# Patient Record
Sex: FEMALE | Race: WHITE | NOT HISPANIC OR LATINO | URBAN - METROPOLITAN AREA
[De-identification: names, ages, dates, MRNs, and addresses within clinical notes are randomized per-mention and may not be internally consistent; named-entity substitution may affect disease eponyms.]

---

## 2017-12-27 ENCOUNTER — INPATIENT (INPATIENT)
Facility: HOSPITAL | Age: 82
LOS: 6 days | Discharge: NURSING, REHAB OR LONG-TERM CARE | End: 2018-01-03
Attending: SURGERY | Admitting: SURGERY
Payer: MEDICARE

## 2017-12-27 VITALS
OXYGEN SATURATION: 97 % | WEIGHT: 192.02 LBS | DIASTOLIC BLOOD PRESSURE: 102 MMHG | SYSTOLIC BLOOD PRESSURE: 157 MMHG | TEMPERATURE: 99 F | HEIGHT: 57 IN | RESPIRATION RATE: 18 BRPM | HEART RATE: 111 BPM

## 2017-12-27 LAB
ALBUMIN SERPL ELPH-MCNC: 3.4 G/DL — SIGNIFICANT CHANGE UP (ref 3.3–5)
ALP SERPL-CCNC: 95 U/L — SIGNIFICANT CHANGE UP (ref 40–120)
ALT FLD-CCNC: 16 U/L — SIGNIFICANT CHANGE UP (ref 12–78)
ANION GAP SERPL CALC-SCNC: 10 MMOL/L — SIGNIFICANT CHANGE UP (ref 5–17)
APPEARANCE UR: CLEAR — SIGNIFICANT CHANGE UP
APTT BLD: 26.4 SEC — LOW (ref 27.5–37.4)
AST SERPL-CCNC: 19 U/L — SIGNIFICANT CHANGE UP (ref 15–37)
BACTERIA # UR AUTO: (no result)
BASOPHILS # BLD AUTO: 0 K/UL — SIGNIFICANT CHANGE UP (ref 0–0.2)
BASOPHILS NFR BLD AUTO: 0.2 % — SIGNIFICANT CHANGE UP (ref 0–2)
BILIRUB SERPL-MCNC: 1.2 MG/DL — SIGNIFICANT CHANGE UP (ref 0.2–1.2)
BILIRUB UR-MCNC: NEGATIVE — SIGNIFICANT CHANGE UP
BUN SERPL-MCNC: 21 MG/DL — SIGNIFICANT CHANGE UP (ref 7–23)
CALCIUM SERPL-MCNC: 9.5 MG/DL — SIGNIFICANT CHANGE UP (ref 8.5–10.1)
CHLORIDE SERPL-SCNC: 97 MMOL/L — SIGNIFICANT CHANGE UP (ref 96–108)
CO2 SERPL-SCNC: 26 MMOL/L — SIGNIFICANT CHANGE UP (ref 22–31)
COLOR SPEC: YELLOW — SIGNIFICANT CHANGE UP
CREAT SERPL-MCNC: 0.99 MG/DL — SIGNIFICANT CHANGE UP (ref 0.5–1.3)
DIFF PNL FLD: (no result)
EOSINOPHIL # BLD AUTO: 0 K/UL — SIGNIFICANT CHANGE UP (ref 0–0.5)
EOSINOPHIL NFR BLD AUTO: 0 % — SIGNIFICANT CHANGE UP (ref 0–6)
EPI CELLS # UR: SIGNIFICANT CHANGE UP
GLUCOSE SERPL-MCNC: 252 MG/DL — HIGH (ref 70–99)
GLUCOSE UR QL: 250 MG/DL
HCT VFR BLD CALC: 48.1 % — HIGH (ref 34.5–45)
HGB BLD-MCNC: 15.5 G/DL — SIGNIFICANT CHANGE UP (ref 11.5–15.5)
HYALINE CASTS # UR AUTO: (no result) /LPF
INR BLD: 1.12 RATIO — SIGNIFICANT CHANGE UP (ref 0.88–1.16)
KETONES UR-MCNC: (no result)
LACTATE SERPL-SCNC: 2.2 MMOL/L — HIGH (ref 0.7–2)
LEUKOCYTE ESTERASE UR-ACNC: (no result)
LYMPHOCYTES # BLD AUTO: 1 K/UL — SIGNIFICANT CHANGE UP (ref 1–3.3)
LYMPHOCYTES # BLD AUTO: 5.9 % — LOW (ref 13–44)
MCHC RBC-ENTMCNC: 28.1 PG — SIGNIFICANT CHANGE UP (ref 27–34)
MCHC RBC-ENTMCNC: 32.2 GM/DL — SIGNIFICANT CHANGE UP (ref 32–36)
MCV RBC AUTO: 87.4 FL — SIGNIFICANT CHANGE UP (ref 80–100)
MONOCYTES # BLD AUTO: 1.1 K/UL — HIGH (ref 0–0.9)
MONOCYTES NFR BLD AUTO: 6.1 % — SIGNIFICANT CHANGE UP (ref 2–14)
NEUTROPHILS # BLD AUTO: 15.6 K/UL — HIGH (ref 1.8–7.4)
NEUTROPHILS NFR BLD AUTO: 87.9 % — HIGH (ref 43–77)
NITRITE UR-MCNC: NEGATIVE — SIGNIFICANT CHANGE UP
PH UR: 5 — SIGNIFICANT CHANGE UP (ref 5–8)
PLATELET # BLD AUTO: 306 K/UL — SIGNIFICANT CHANGE UP (ref 150–400)
POTASSIUM SERPL-MCNC: 4 MMOL/L — SIGNIFICANT CHANGE UP (ref 3.5–5.3)
POTASSIUM SERPL-SCNC: 4 MMOL/L — SIGNIFICANT CHANGE UP (ref 3.5–5.3)
PROT SERPL-MCNC: 7.9 GM/DL — SIGNIFICANT CHANGE UP (ref 6–8.3)
PROT UR-MCNC: 100 MG/DL
PROTHROM AB SERPL-ACNC: 12.1 SEC — SIGNIFICANT CHANGE UP (ref 9.8–12.7)
RBC # BLD: 5.5 M/UL — HIGH (ref 3.8–5.2)
RBC # FLD: 14.6 % — HIGH (ref 10.3–14.5)
RBC CASTS # UR COMP ASSIST: (no result) /HPF (ref 0–4)
SODIUM SERPL-SCNC: 133 MMOL/L — LOW (ref 135–145)
SP GR SPEC: 1.02 — SIGNIFICANT CHANGE UP (ref 1.01–1.02)
TROPONIN I SERPL-MCNC: 0.06 NG/ML — HIGH (ref 0.01–0.04)
UROBILINOGEN FLD QL: 1 MG/DL
WBC # BLD: 17.8 K/UL — HIGH (ref 3.8–10.5)
WBC # FLD AUTO: 17.8 K/UL — HIGH (ref 3.8–10.5)
WBC UR QL: (no result)

## 2017-12-27 PROCEDURE — 71010: CPT | Mod: 26

## 2017-12-27 RX ORDER — SODIUM CHLORIDE 9 MG/ML
3 INJECTION INTRAMUSCULAR; INTRAVENOUS; SUBCUTANEOUS ONCE
Qty: 0 | Refills: 0 | Status: COMPLETED | OUTPATIENT
Start: 2017-12-27 | End: 2017-12-27

## 2017-12-27 RX ORDER — ONDANSETRON 8 MG/1
8 TABLET, FILM COATED ORAL ONCE
Qty: 0 | Refills: 0 | Status: COMPLETED | OUTPATIENT
Start: 2017-12-27 | End: 2017-12-27

## 2017-12-27 RX ORDER — CIPROFLOXACIN LACTATE 400MG/40ML
400 VIAL (ML) INTRAVENOUS ONCE
Qty: 0 | Refills: 0 | Status: COMPLETED | OUTPATIENT
Start: 2017-12-27 | End: 2017-12-27

## 2017-12-27 RX ORDER — ACETAMINOPHEN 500 MG
650 TABLET ORAL ONCE
Qty: 0 | Refills: 0 | Status: COMPLETED | OUTPATIENT
Start: 2017-12-27 | End: 2017-12-27

## 2017-12-27 RX ADMIN — SODIUM CHLORIDE 3 MILLILITER(S): 9 INJECTION INTRAMUSCULAR; INTRAVENOUS; SUBCUTANEOUS at 21:15

## 2017-12-27 RX ADMIN — Medication 200 MILLIGRAM(S): at 23:42

## 2017-12-27 RX ADMIN — ONDANSETRON 8 MILLIGRAM(S): 8 TABLET, FILM COATED ORAL at 22:48

## 2017-12-27 RX ADMIN — Medication 650 MILLIGRAM(S): at 21:15

## 2017-12-27 RX ADMIN — Medication 10 MILLIGRAM(S): at 23:42

## 2017-12-27 NOTE — ED ADULT TRIAGE NOTE - CHIEF COMPLAINT QUOTE
Pt BIBA c/o weakness, nausea and vomiting. As per pt's family, pt has not eaten in the last few days and has not been taking medication.  Pt with hx of colon ca, a-fib.

## 2017-12-27 NOTE — ED PROVIDER NOTE - OBJECTIVE STATEMENT
89 y/o F w/ pmhx of colon cancer, colon resection, Afib, presents to ED c/o weakness, and intermittent abdominal pain. Pt has been having worsening abd pain, gas, indigestion and vomiting. As per family pt cannot tolerate PO, pt vomits 30 minutes after eating. Last BM 2 days ago. This week pain has been worsening, especially bad past 2 days. GI Dr. Herrera. Pt lives with family.

## 2017-12-27 NOTE — ED ADULT NURSE NOTE - OBJECTIVE STATEMENT
pt c/o of dark burgundy emesis, nausea, constipation and weakness x2 days. patient with fever upon arrival. cardiac and VS monitoring initiated.

## 2017-12-27 NOTE — ED PROVIDER NOTE - PROGRESS NOTE DETAILS
AS:  pt received at shift change from Dr Mckeon pending CT.  CT report called by Dr Sanches, high grade SBO with transition at hernia.  Case d/w Dr Villafana, will see pt

## 2017-12-27 NOTE — ED PROVIDER NOTE - NS_ ATTENDINGSCRIBEDETAILS _ED_A_ED_FT
I, Yaya Mckeon MD,  performed the initial face to face bedside interview with this patient regarding history of present illness, review of symptoms and relevant past medical, social and family history.  I completed an independent physical examination.    The history, relevant review of systems, past medical and surgical history, medical decision making, and physical examination was documented by the scribe in my presence and I attest to the accuracy of the documentation.

## 2017-12-28 DIAGNOSIS — I48.2 CHRONIC ATRIAL FIBRILLATION: ICD-10-CM

## 2017-12-28 DIAGNOSIS — Z90.49 ACQUIRED ABSENCE OF OTHER SPECIFIED PARTS OF DIGESTIVE TRACT: Chronic | ICD-10-CM

## 2017-12-28 DIAGNOSIS — Z96.642 PRESENCE OF LEFT ARTIFICIAL HIP JOINT: Chronic | ICD-10-CM

## 2017-12-28 DIAGNOSIS — K56.609 UNSPECIFIED INTESTINAL OBSTRUCTION, UNSPECIFIED AS TO PARTIAL VERSUS COMPLETE OBSTRUCTION: ICD-10-CM

## 2017-12-28 DIAGNOSIS — I10 ESSENTIAL (PRIMARY) HYPERTENSION: ICD-10-CM

## 2017-12-28 LAB
ABO RH CONFIRMATION: SIGNIFICANT CHANGE UP
ANION GAP SERPL CALC-SCNC: 10 MMOL/L — SIGNIFICANT CHANGE UP (ref 5–17)
ANION GAP SERPL CALC-SCNC: 8 MMOL/L — SIGNIFICANT CHANGE UP (ref 5–17)
BASOPHILS # BLD AUTO: 0 K/UL — SIGNIFICANT CHANGE UP (ref 0–0.2)
BASOPHILS NFR BLD AUTO: 0.2 % — SIGNIFICANT CHANGE UP (ref 0–2)
BLD GP AB SCN SERPL QL: SIGNIFICANT CHANGE UP
BUN SERPL-MCNC: 24 MG/DL — HIGH (ref 7–23)
BUN SERPL-MCNC: 30 MG/DL — HIGH (ref 7–23)
CALCIUM SERPL-MCNC: 7.8 MG/DL — LOW (ref 8.5–10.1)
CALCIUM SERPL-MCNC: 8.4 MG/DL — LOW (ref 8.5–10.1)
CHLORIDE SERPL-SCNC: 103 MMOL/L — SIGNIFICANT CHANGE UP (ref 96–108)
CHLORIDE SERPL-SCNC: 99 MMOL/L — SIGNIFICANT CHANGE UP (ref 96–108)
CO2 SERPL-SCNC: 22 MMOL/L — SIGNIFICANT CHANGE UP (ref 22–31)
CO2 SERPL-SCNC: 27 MMOL/L — SIGNIFICANT CHANGE UP (ref 22–31)
CREAT SERPL-MCNC: 0.92 MG/DL — SIGNIFICANT CHANGE UP (ref 0.5–1.3)
CREAT SERPL-MCNC: 1.03 MG/DL — SIGNIFICANT CHANGE UP (ref 0.5–1.3)
EOSINOPHIL # BLD AUTO: 0 K/UL — SIGNIFICANT CHANGE UP (ref 0–0.5)
EOSINOPHIL NFR BLD AUTO: 0.2 % — SIGNIFICANT CHANGE UP (ref 0–6)
GLUCOSE BLDC GLUCOMTR-MCNC: 130 MG/DL — HIGH (ref 70–99)
GLUCOSE BLDC GLUCOMTR-MCNC: 163 MG/DL — HIGH (ref 70–99)
GLUCOSE SERPL-MCNC: 150 MG/DL — HIGH (ref 70–99)
GLUCOSE SERPL-MCNC: 204 MG/DL — HIGH (ref 70–99)
HBA1C BLD-MCNC: 6 % — HIGH (ref 4–5.6)
HCT VFR BLD CALC: 39.8 % — SIGNIFICANT CHANGE UP (ref 34.5–45)
HCT VFR BLD CALC: 44 % — SIGNIFICANT CHANGE UP (ref 34.5–45)
HGB BLD-MCNC: 12.7 G/DL — SIGNIFICANT CHANGE UP (ref 11.5–15.5)
HGB BLD-MCNC: 14 G/DL — SIGNIFICANT CHANGE UP (ref 11.5–15.5)
LACTATE SERPL-SCNC: 1.8 MMOL/L — SIGNIFICANT CHANGE UP (ref 0.7–2)
LYMPHOCYTES # BLD AUTO: 1.2 K/UL — SIGNIFICANT CHANGE UP (ref 1–3.3)
LYMPHOCYTES # BLD AUTO: 6.7 % — LOW (ref 13–44)
MAGNESIUM SERPL-MCNC: 1.6 MG/DL — SIGNIFICANT CHANGE UP (ref 1.6–2.6)
MCHC RBC-ENTMCNC: 27.7 PG — SIGNIFICANT CHANGE UP (ref 27–34)
MCHC RBC-ENTMCNC: 28.2 PG — SIGNIFICANT CHANGE UP (ref 27–34)
MCHC RBC-ENTMCNC: 31.8 GM/DL — LOW (ref 32–36)
MCHC RBC-ENTMCNC: 31.8 GM/DL — LOW (ref 32–36)
MCV RBC AUTO: 87.2 FL — SIGNIFICANT CHANGE UP (ref 80–100)
MCV RBC AUTO: 88.9 FL — SIGNIFICANT CHANGE UP (ref 80–100)
MONOCYTES # BLD AUTO: 1 K/UL — HIGH (ref 0–0.9)
MONOCYTES NFR BLD AUTO: 5.8 % — SIGNIFICANT CHANGE UP (ref 2–14)
NEUTROPHILS # BLD AUTO: 15.3 K/UL — HIGH (ref 1.8–7.4)
NEUTROPHILS NFR BLD AUTO: 87 % — HIGH (ref 43–77)
PLATELET # BLD AUTO: 167 K/UL — SIGNIFICANT CHANGE UP (ref 150–400)
PLATELET # BLD AUTO: 239 K/UL — SIGNIFICANT CHANGE UP (ref 150–400)
POTASSIUM SERPL-MCNC: 3.6 MMOL/L — SIGNIFICANT CHANGE UP (ref 3.5–5.3)
POTASSIUM SERPL-MCNC: 4 MMOL/L — SIGNIFICANT CHANGE UP (ref 3.5–5.3)
POTASSIUM SERPL-SCNC: 3.6 MMOL/L — SIGNIFICANT CHANGE UP (ref 3.5–5.3)
POTASSIUM SERPL-SCNC: 4 MMOL/L — SIGNIFICANT CHANGE UP (ref 3.5–5.3)
RBC # BLD: 4.48 M/UL — SIGNIFICANT CHANGE UP (ref 3.8–5.2)
RBC # BLD: 5.04 M/UL — SIGNIFICANT CHANGE UP (ref 3.8–5.2)
RBC # FLD: 14.6 % — HIGH (ref 10.3–14.5)
RBC # FLD: 14.8 % — HIGH (ref 10.3–14.5)
SODIUM SERPL-SCNC: 134 MMOL/L — LOW (ref 135–145)
SODIUM SERPL-SCNC: 135 MMOL/L — SIGNIFICANT CHANGE UP (ref 135–145)
TYPE + AB SCN PNL BLD: SIGNIFICANT CHANGE UP
WBC # BLD: 17.6 K/UL — HIGH (ref 3.8–10.5)
WBC # BLD: 9.9 K/UL — SIGNIFICANT CHANGE UP (ref 3.8–10.5)
WBC # FLD AUTO: 17.6 K/UL — HIGH (ref 3.8–10.5)
WBC # FLD AUTO: 9.9 K/UL — SIGNIFICANT CHANGE UP (ref 3.8–10.5)

## 2017-12-28 PROCEDURE — 93010 ELECTROCARDIOGRAM REPORT: CPT

## 2017-12-28 PROCEDURE — 99223 1ST HOSP IP/OBS HIGH 75: CPT

## 2017-12-28 PROCEDURE — 88302 TISSUE EXAM BY PATHOLOGIST: CPT | Mod: 26

## 2017-12-28 PROCEDURE — 74177 CT ABD & PELVIS W/CONTRAST: CPT | Mod: 26

## 2017-12-28 PROCEDURE — 99285 EMERGENCY DEPT VISIT HI MDM: CPT

## 2017-12-28 RX ORDER — DEXTROSE 50 % IN WATER 50 %
25 SYRINGE (ML) INTRAVENOUS ONCE
Qty: 0 | Refills: 0 | Status: DISCONTINUED | OUTPATIENT
Start: 2017-12-28 | End: 2017-12-30

## 2017-12-28 RX ORDER — HEPARIN SODIUM 5000 [USP'U]/ML
5000 INJECTION INTRAVENOUS; SUBCUTANEOUS EVERY 12 HOURS
Qty: 0 | Refills: 0 | Status: DISCONTINUED | OUTPATIENT
Start: 2017-12-28 | End: 2017-12-28

## 2017-12-28 RX ORDER — INSULIN GLARGINE 100 [IU]/ML
12 INJECTION, SOLUTION SUBCUTANEOUS
Qty: 0 | Refills: 0 | COMMUNITY

## 2017-12-28 RX ORDER — CIPROFLOXACIN LACTATE 400MG/40ML
400 VIAL (ML) INTRAVENOUS EVERY 12 HOURS
Qty: 0 | Refills: 0 | Status: DISCONTINUED | OUTPATIENT
Start: 2017-12-28 | End: 2017-12-28

## 2017-12-28 RX ORDER — METOPROLOL TARTRATE 50 MG
5 TABLET ORAL EVERY 6 HOURS
Qty: 0 | Refills: 0 | Status: DISCONTINUED | OUTPATIENT
Start: 2017-12-28 | End: 2018-01-01

## 2017-12-28 RX ORDER — DEXTROSE 50 % IN WATER 50 %
25 SYRINGE (ML) INTRAVENOUS ONCE
Qty: 0 | Refills: 0 | Status: DISCONTINUED | OUTPATIENT
Start: 2017-12-28 | End: 2017-12-28

## 2017-12-28 RX ORDER — METRONIDAZOLE 500 MG
500 TABLET ORAL ONCE
Qty: 0 | Refills: 0 | Status: DISCONTINUED | OUTPATIENT
Start: 2017-12-28 | End: 2017-12-28

## 2017-12-28 RX ORDER — DEXTROSE 50 % IN WATER 50 %
1 SYRINGE (ML) INTRAVENOUS ONCE
Qty: 0 | Refills: 0 | Status: DISCONTINUED | OUTPATIENT
Start: 2017-12-28 | End: 2017-12-28

## 2017-12-28 RX ORDER — SODIUM CHLORIDE 9 MG/ML
1000 INJECTION INTRAMUSCULAR; INTRAVENOUS; SUBCUTANEOUS ONCE
Qty: 0 | Refills: 0 | Status: COMPLETED | OUTPATIENT
Start: 2017-12-28 | End: 2017-12-28

## 2017-12-28 RX ORDER — ONDANSETRON 8 MG/1
4 TABLET, FILM COATED ORAL EVERY 6 HOURS
Qty: 0 | Refills: 0 | Status: DISCONTINUED | OUTPATIENT
Start: 2017-12-28 | End: 2018-01-03

## 2017-12-28 RX ORDER — GLIPIZIDE/METFORMIN HCL 2.5-500 MG
0 TABLET ORAL
Qty: 0 | Refills: 0 | COMMUNITY

## 2017-12-28 RX ORDER — HYDROMORPHONE HYDROCHLORIDE 2 MG/ML
30 INJECTION INTRAMUSCULAR; INTRAVENOUS; SUBCUTANEOUS
Qty: 0 | Refills: 0 | Status: DISCONTINUED | OUTPATIENT
Start: 2017-12-28 | End: 2017-12-30

## 2017-12-28 RX ORDER — HYDROMORPHONE HYDROCHLORIDE 2 MG/ML
0.5 INJECTION INTRAMUSCULAR; INTRAVENOUS; SUBCUTANEOUS EVERY 4 HOURS
Qty: 0 | Refills: 0 | Status: DISCONTINUED | OUTPATIENT
Start: 2017-12-28 | End: 2017-12-28

## 2017-12-28 RX ORDER — INSULIN LISPRO 100/ML
VIAL (ML) SUBCUTANEOUS
Qty: 0 | Refills: 0 | Status: DISCONTINUED | OUTPATIENT
Start: 2017-12-28 | End: 2017-12-28

## 2017-12-28 RX ORDER — INSULIN LISPRO 100/ML
VIAL (ML) SUBCUTANEOUS
Qty: 0 | Refills: 0 | Status: DISCONTINUED | OUTPATIENT
Start: 2017-12-28 | End: 2017-12-29

## 2017-12-28 RX ORDER — METRONIDAZOLE 500 MG
500 TABLET ORAL EVERY 8 HOURS
Qty: 0 | Refills: 0 | Status: DISCONTINUED | OUTPATIENT
Start: 2017-12-28 | End: 2017-12-28

## 2017-12-28 RX ORDER — METRONIDAZOLE 500 MG
500 TABLET ORAL ONCE
Qty: 0 | Refills: 0 | Status: COMPLETED | OUTPATIENT
Start: 2017-12-28 | End: 2017-12-28

## 2017-12-28 RX ORDER — ACETAMINOPHEN 500 MG
650 TABLET ORAL EVERY 6 HOURS
Qty: 0 | Refills: 0 | Status: DISCONTINUED | OUTPATIENT
Start: 2017-12-28 | End: 2018-01-03

## 2017-12-28 RX ORDER — SODIUM CHLORIDE 9 MG/ML
1000 INJECTION INTRAMUSCULAR; INTRAVENOUS; SUBCUTANEOUS
Qty: 0 | Refills: 0 | Status: DISCONTINUED | OUTPATIENT
Start: 2017-12-28 | End: 2017-12-28

## 2017-12-28 RX ORDER — GLUCAGON INJECTION, SOLUTION 0.5 MG/.1ML
1 INJECTION, SOLUTION SUBCUTANEOUS ONCE
Qty: 0 | Refills: 0 | Status: DISCONTINUED | OUTPATIENT
Start: 2017-12-28 | End: 2017-12-30

## 2017-12-28 RX ORDER — ONDANSETRON 8 MG/1
4 TABLET, FILM COATED ORAL ONCE
Qty: 0 | Refills: 0 | Status: DISCONTINUED | OUTPATIENT
Start: 2017-12-28 | End: 2017-12-28

## 2017-12-28 RX ORDER — GLUCAGON INJECTION, SOLUTION 0.5 MG/.1ML
1 INJECTION, SOLUTION SUBCUTANEOUS ONCE
Qty: 0 | Refills: 0 | Status: DISCONTINUED | OUTPATIENT
Start: 2017-12-28 | End: 2017-12-28

## 2017-12-28 RX ORDER — PANTOPRAZOLE SODIUM 20 MG/1
40 TABLET, DELAYED RELEASE ORAL DAILY
Qty: 0 | Refills: 0 | Status: DISCONTINUED | OUTPATIENT
Start: 2017-12-28 | End: 2017-12-28

## 2017-12-28 RX ORDER — METRONIDAZOLE 500 MG
TABLET ORAL
Qty: 0 | Refills: 0 | Status: DISCONTINUED | OUTPATIENT
Start: 2017-12-28 | End: 2017-12-28

## 2017-12-28 RX ORDER — CIPROFLOXACIN LACTATE 400MG/40ML
VIAL (ML) INTRAVENOUS
Qty: 0 | Refills: 0 | Status: DISCONTINUED | OUTPATIENT
Start: 2017-12-28 | End: 2017-12-28

## 2017-12-28 RX ORDER — DEXTROSE 50 % IN WATER 50 %
12.5 SYRINGE (ML) INTRAVENOUS ONCE
Qty: 0 | Refills: 0 | Status: DISCONTINUED | OUTPATIENT
Start: 2017-12-28 | End: 2017-12-30

## 2017-12-28 RX ORDER — CIPROFLOXACIN LACTATE 400MG/40ML
400 VIAL (ML) INTRAVENOUS ONCE
Qty: 0 | Refills: 0 | Status: DISCONTINUED | OUTPATIENT
Start: 2017-12-28 | End: 2017-12-28

## 2017-12-28 RX ORDER — NALOXONE HYDROCHLORIDE 4 MG/.1ML
0.1 SPRAY NASAL
Qty: 0 | Refills: 0 | Status: DISCONTINUED | OUTPATIENT
Start: 2017-12-28 | End: 2018-01-03

## 2017-12-28 RX ORDER — HEPARIN SODIUM 5000 [USP'U]/ML
5000 INJECTION INTRAVENOUS; SUBCUTANEOUS EVERY 12 HOURS
Qty: 0 | Refills: 0 | Status: DISCONTINUED | OUTPATIENT
Start: 2017-12-28 | End: 2018-01-01

## 2017-12-28 RX ORDER — CIPROFLOXACIN LACTATE 400MG/40ML
400 VIAL (ML) INTRAVENOUS ONCE
Qty: 0 | Refills: 0 | Status: COMPLETED | OUTPATIENT
Start: 2017-12-28 | End: 2017-12-28

## 2017-12-28 RX ORDER — SODIUM CHLORIDE 9 MG/ML
1000 INJECTION, SOLUTION INTRAVENOUS
Qty: 0 | Refills: 0 | Status: DISCONTINUED | OUTPATIENT
Start: 2017-12-28 | End: 2017-12-30

## 2017-12-28 RX ORDER — ACETAMINOPHEN 500 MG
650 TABLET ORAL EVERY 6 HOURS
Qty: 0 | Refills: 0 | Status: DISCONTINUED | OUTPATIENT
Start: 2017-12-28 | End: 2017-12-28

## 2017-12-28 RX ORDER — HYDROMORPHONE HYDROCHLORIDE 2 MG/ML
1 INJECTION INTRAMUSCULAR; INTRAVENOUS; SUBCUTANEOUS EVERY 4 HOURS
Qty: 0 | Refills: 0 | Status: DISCONTINUED | OUTPATIENT
Start: 2017-12-28 | End: 2017-12-28

## 2017-12-28 RX ORDER — ACETAMINOPHEN 500 MG
1000 TABLET ORAL ONCE
Qty: 0 | Refills: 0 | Status: DISCONTINUED | OUTPATIENT
Start: 2017-12-28 | End: 2017-12-28

## 2017-12-28 RX ORDER — PROTHROMBIN COMPLEX CONCENTRATE (HUMAN) 25.5; 16.5; 24; 22; 22; 26 [IU]/ML; [IU]/ML; [IU]/ML; [IU]/ML; [IU]/ML; [IU]/ML
3000 POWDER, FOR SOLUTION INTRAVENOUS ONCE
Qty: 0 | Refills: 0 | Status: COMPLETED | OUTPATIENT
Start: 2017-12-28 | End: 2017-12-28

## 2017-12-28 RX ORDER — SIMVASTATIN 20 MG/1
0 TABLET, FILM COATED ORAL
Qty: 0 | Refills: 0 | COMMUNITY

## 2017-12-28 RX ORDER — SODIUM CHLORIDE 9 MG/ML
1000 INJECTION, SOLUTION INTRAVENOUS
Qty: 0 | Refills: 0 | Status: DISCONTINUED | OUTPATIENT
Start: 2017-12-28 | End: 2017-12-28

## 2017-12-28 RX ORDER — ONDANSETRON 8 MG/1
4 TABLET, FILM COATED ORAL EVERY 6 HOURS
Qty: 0 | Refills: 0 | Status: DISCONTINUED | OUTPATIENT
Start: 2017-12-28 | End: 2017-12-28

## 2017-12-28 RX ORDER — PIOGLITAZONE HYDROCHLORIDE 15 MG/1
1 TABLET ORAL
Qty: 0 | Refills: 0 | COMMUNITY

## 2017-12-28 RX ORDER — HYDROMORPHONE HYDROCHLORIDE 2 MG/ML
0.5 INJECTION INTRAMUSCULAR; INTRAVENOUS; SUBCUTANEOUS
Qty: 0 | Refills: 0 | Status: DISCONTINUED | OUTPATIENT
Start: 2017-12-28 | End: 2017-12-30

## 2017-12-28 RX ORDER — DEXTROSE MONOHYDRATE, SODIUM CHLORIDE, AND POTASSIUM CHLORIDE 50; .745; 4.5 G/1000ML; G/1000ML; G/1000ML
1000 INJECTION, SOLUTION INTRAVENOUS
Qty: 0 | Refills: 0 | Status: DISCONTINUED | OUTPATIENT
Start: 2017-12-28 | End: 2017-12-30

## 2017-12-28 RX ORDER — PANTOPRAZOLE SODIUM 20 MG/1
40 TABLET, DELAYED RELEASE ORAL DAILY
Qty: 0 | Refills: 0 | Status: DISCONTINUED | OUTPATIENT
Start: 2017-12-28 | End: 2018-01-03

## 2017-12-28 RX ORDER — TRAMADOL HYDROCHLORIDE 50 MG/1
0 TABLET ORAL
Qty: 0 | Refills: 0 | COMMUNITY

## 2017-12-28 RX ORDER — DEXTROSE 50 % IN WATER 50 %
1 SYRINGE (ML) INTRAVENOUS ONCE
Qty: 0 | Refills: 0 | Status: DISCONTINUED | OUTPATIENT
Start: 2017-12-28 | End: 2017-12-30

## 2017-12-28 RX ORDER — DEXTROSE 50 % IN WATER 50 %
12.5 SYRINGE (ML) INTRAVENOUS ONCE
Qty: 0 | Refills: 0 | Status: DISCONTINUED | OUTPATIENT
Start: 2017-12-28 | End: 2017-12-28

## 2017-12-28 RX ADMIN — SODIUM CHLORIDE 100 MILLILITER(S): 9 INJECTION INTRAMUSCULAR; INTRAVENOUS; SUBCUTANEOUS at 03:06

## 2017-12-28 RX ADMIN — DEXTROSE MONOHYDRATE, SODIUM CHLORIDE, AND POTASSIUM CHLORIDE 100 MILLILITER(S): 50; .745; 4.5 INJECTION, SOLUTION INTRAVENOUS at 20:40

## 2017-12-28 RX ADMIN — PANTOPRAZOLE SODIUM 40 MILLIGRAM(S): 20 TABLET, DELAYED RELEASE ORAL at 13:34

## 2017-12-28 RX ADMIN — HEPARIN SODIUM 5000 UNIT(S): 5000 INJECTION INTRAVENOUS; SUBCUTANEOUS at 18:03

## 2017-12-28 RX ADMIN — Medication 100 MILLIGRAM(S): at 16:01

## 2017-12-28 RX ADMIN — SODIUM CHLORIDE 100 MILLILITER(S): 9 INJECTION INTRAMUSCULAR; INTRAVENOUS; SUBCUTANEOUS at 04:38

## 2017-12-28 RX ADMIN — SODIUM CHLORIDE 1000 MILLILITER(S): 9 INJECTION INTRAMUSCULAR; INTRAVENOUS; SUBCUTANEOUS at 16:05

## 2017-12-28 RX ADMIN — SODIUM CHLORIDE 1000 MILLILITER(S): 9 INJECTION INTRAMUSCULAR; INTRAVENOUS; SUBCUTANEOUS at 16:55

## 2017-12-28 RX ADMIN — PROTHROMBIN COMPLEX CONCENTRATE (HUMAN) 400 INTERNATIONAL UNIT(S): 25.5; 16.5; 24; 22; 22; 26 POWDER, FOR SOLUTION INTRAVENOUS at 05:03

## 2017-12-28 RX ADMIN — HYDROMORPHONE HYDROCHLORIDE 30 MILLILITER(S): 2 INJECTION INTRAMUSCULAR; INTRAVENOUS; SUBCUTANEOUS at 07:36

## 2017-12-28 RX ADMIN — Medication: at 13:29

## 2017-12-28 RX ADMIN — Medication 200 MILLIGRAM(S): at 12:02

## 2017-12-28 RX ADMIN — Medication 100 MILLIGRAM(S): at 08:45

## 2017-12-28 NOTE — CONSULT NOTE ADULT - SUBJECTIVE AND OBJECTIVE BOX
HPI:  this is a 89 yo female adm yesterday with cc of  worsening abd pain, nausea, vomiting since 12/24/17. Pt denied c/o fever, chills, chest pain, SOB, extremity pain or dysfunction, hemoptysis, hematemesis, hematuria, hematochexia, headache, diplopia, vertigo, dizzyness. (28 Dec 2017 03:48) found to have a incarcerated ventral hernia, now in ICU SD S/P repair and CARL, IN ER was given K centra for reversal of xarelto      Patient is a 88y old  Female who presents with a chief complaint of abd pain, nausea, vomitting since 12/24/17 (28 Dec 2017 03:48)      Consulted by Dr. Lucas  for VTE prophylaxis, risk stratification, and anticoagulation management.    PAST MEDICAL & SURGICAL HISTORY:  Hyperlipidemia, unspecified hyperlipidemia type  Chronic atrial fibrillation in Xarelto  Type 2 diabetes mellitus without complication, unspecified long term insulin use status  Hypertension, unspecified type  History of left hip replacement  History of colon resection          CrCl: 53.1    Caprini VTE Risk Score: #8        [id number] Score: #5    IMPROVE Bleeding Risk Score #3.5    Falls Risk:   High (x  )  Mod (  )  Low (  )      FAMILY HISTORY:  No pertinent family history in first degree relatives    Denies any personal or familial history of clotting or bleeding disorders.    Allergies    penicillin (Rash)    Intolerances        REVIEW OF SYSTEMS    (  )Fever	     (  )Constipation	(  )SOB				(  )Headache	(  )Dysuria  (  )Chills	     (  )Melena	(  )Dyspnea present on exertion	                    (  )Dizziness                    (  )Polyuria  (  )Nausea	     (  )Hematochezia	(  )Cough			                    (  )Syncope   	(  )Hematuria  (  )Vomiting    (  )Chest Pain	(  )Wheezing			(  )Weakness  (  )Diarrhea     (  )Palpitations	(  )Anorexia			(  )Myalgia    All other review of systems negative: Yes          PHYSICAL EXAM:    Constitutional: Appears Well    Neurological: A& O x 3    Skin: Warm    Respiratory and Thorax: normal effort; Breath sounds: normal; No rales/wheezing/rhonchi  	  Cardiovascular: S1, S2, regular, NMBR	    Gastrointestinal: BS + x 4Q, nontender	    Genitourinary:  Bladder nondistended, nontender    Musculoskeletal:   General Right:   no muscle/joint tenderness,   normal tone, no joint swelling,   ROM: limited/full	    General Left:   no muscle/joint tenderness,   normal tone, no joint swelling,   ROM: limited/full    Lower extrems:   Right: no calf tenderness              negative natty's sign               + pedal pulses    Left:   no calf tenderness              negative natty's sign               + pedal pulses                          14.0   17.6  )-----------( 239      ( 28 Dec 2017 07:25 )             44.0       12-28    134<L>  |  99  |  24<H>  ----------------------------<  204<H>  3.6   |  27  |  0.92    Ca    8.4<L>      28 Dec 2017 07:25    TPro  7.9  /  Alb  3.4  /  TBili  1.2  /  DBili  x   /  AST  19  /  ALT  16  /  AlkPhos  95  12-27      PT/INR - ( 27 Dec 2017 21:06 )   PT: 12.1 sec;   INR: 1.12 ratio         PTT - ( 27 Dec 2017 21:06 )  PTT:26.4 sec				    MEDICATIONS  (STANDING):  ciprofloxacin   IVPB      ciprofloxacin   IVPB 400 milliGRAM(s) IV Intermittent every 12 hours  dextrose 5%. 1000 milliLiter(s) IV Continuous <Continuous>  dextrose 50% Injectable 12.5 Gram(s) IV Push once  dextrose 50% Injectable 25 Gram(s) IV Push once  dextrose 50% Injectable 25 Gram(s) IV Push once  heparin  Injectable 5000 Unit(s) SubCutaneous every 12 hours  HYDROmorphone PCA (1 mG/mL) 30 milliLiter(s) PCA Continuous PCA Continuous  insulin lispro (HumaLOG) corrective regimen sliding scale   SubCutaneous three times a day before meals  metoprolol    tartrate Injectable 5 milliGRAM(s) IV Push every 6 hours  metroNIDAZOLE  IVPB      metroNIDAZOLE  IVPB 500 milliGRAM(s) IV Intermittent every 8 hours  pantoprazole  Injectable 40 milliGRAM(s) IV Push daily  sodium chloride 0.9% Bolus 1000 milliLiter(s) IV Bolus once  sodium chloride 0.9%. 1000 milliLiter(s) IV Continuous <Continuous>          DVT Prophylaxis:  LMWH                   (  )  Heparin SQ           ( x)  Coumadin             (  )  Xarelto                  (  )  Eliquis                   (  )  Venodynes           ( x )  Ambulation          (x  )  UFH                       (  )  Contraindicated  (  ) HPI:  this is a 89 yo female adm yesterday with cc of  worsening abd pain, nausea, vomiting since 12/24/17. Pt denied c/o fever, chills, chest pain, SOB, extremity pain or dysfunction, hemoptysis, hematemesis, hematuria, hematochexia, headache, diplopia, vertigo, dizzyness. (28 Dec 2017 03:48) found to have a incarcerated ventral hernia, now in ICU SD S/P repair and CARL, IN ER was given K centra for reversal of xarelto      Patient is a 88y old  Female who presents with a chief complaint of abd pain, nausea, vomitting since 12/24/17 (28 Dec 2017 03:48)      Consulted by Dr. Lucas  for VTE prophylaxis, risk stratification, and anticoagulation management.    PAST MEDICAL & SURGICAL HISTORY:  Hyperlipidemia, unspecified hyperlipidemia type  Chronic atrial fibrillation in Xarelto  Type 2 diabetes mellitus without complication, unspecified long term insulin use status  Hypertension, unspecified type  History of left hip replacement  History of colon resection          CrCl: 53.1    Caprini VTE Risk Score: #8        [id number] Score: #5    IMPROVE Bleeding Risk Score #3.5    Falls Risk:   High (x  )  Mod (  )  Low (  )      FAMILY HISTORY:  No pertinent family history in first degree relatives    Denies any personal or familial history of clotting or bleeding disorders.    Allergies    penicillin (Rash)    Intolerances        REVIEW OF SYSTEMS    (  )Fever	     (  )Constipation	(  )SOB				(  )Headache	(  )Dysuria  (  )Chills	     (  )Melena	(  )Dyspnea present on exertion	                    (  )Dizziness                    (  )Polyuria  (  )Nausea	     (  )Hematochezia	(  )Cough			                    (  )Syncope   	(  )Hematuria  (  )Vomiting    (  )Chest Pain	(  )Wheezing			(  )Weakness  (  )Diarrhea     (  )Palpitations	(  )Anorexia			(  )Myalgia    All other review of systems negative: Yes          PHYSICAL EXAM:    Constitutional: Appears Well    Neurological: somnolent    Skin: Warm    Respiratory and Thorax: normal effort; Breath sounds: normal; No rales/wheezing/rhonchi  	  Cardiovascular: S1, S2, irregular, NMBR	MP AFib     Gastrointestinal: BS neg, dressing intact, NCT- LCWS draining brown drainage    Genitourinary:  Bladder: pope in place    Musculoskeletal:   General Right:   no muscle/joint tenderness,   normal tone, no joint swelling,   ROM: full	    General Left:   no muscle/joint tenderness,   normal tone, no joint swelling,   ROM: full    Lower extrems:   Right: no calf tenderness              negative natty's sign               + pedal pulses    Left:   no calf tenderness              negative natty's sign               + pedal pulses                          14.0   17.6  )-----------( 239      ( 28 Dec 2017 07:25 )             44.0       12-28    134<L>  |  99  |  24<H>  ----------------------------<  204<H>  3.6   |  27  |  0.92    Ca    8.4<L>      28 Dec 2017 07:25    TPro  7.9  /  Alb  3.4  /  TBili  1.2  /  DBili  x   /  AST  19  /  ALT  16  /  AlkPhos  95  12-27      PT/INR - ( 27 Dec 2017 21:06 )   PT: 12.1 sec;   INR: 1.12 ratio         PTT - ( 27 Dec 2017 21:06 )  PTT:26.4 sec				    MEDICATIONS  (STANDING):  ciprofloxacin   IVPB      ciprofloxacin   IVPB 400 milliGRAM(s) IV Intermittent every 12 hours  dextrose 5%. 1000 milliLiter(s) IV Continuous <Continuous>  dextrose 50% Injectable 12.5 Gram(s) IV Push once  dextrose 50% Injectable 25 Gram(s) IV Push once  dextrose 50% Injectable 25 Gram(s) IV Push once  heparin  Injectable 5000 Unit(s) SubCutaneous every 12 hours  HYDROmorphone PCA (1 mG/mL) 30 milliLiter(s) PCA Continuous PCA Continuous  insulin lispro (HumaLOG) corrective regimen sliding scale   SubCutaneous three times a day before meals  metoprolol    tartrate Injectable 5 milliGRAM(s) IV Push every 6 hours  metroNIDAZOLE  IVPB      metroNIDAZOLE  IVPB 500 milliGRAM(s) IV Intermittent every 8 hours  pantoprazole  Injectable 40 milliGRAM(s) IV Push daily  sodium chloride 0.9% Bolus 1000 milliLiter(s) IV Bolus once  sodium chloride 0.9%. 1000 milliLiter(s) IV Continuous <Continuous>          DVT Prophylaxis:  LMWH                   (  )  Heparin SQ           ( x)  Coumadin             (  )  Xarelto                  (  )  Eliquis                   (  )  Venodynes           ( x )  Ambulation          (x  )  UFH                       (  )  Contraindicated  (  )

## 2017-12-28 NOTE — BRIEF OPERATIVE NOTE - OPERATION/FINDINGS
peritoneal adhesions from prior surgical intervention; incarcerated periumbilical incisional hernia with bowel and omentum. Adhesionolysis performed. Hernia sac removed. Biologic mesh placed for hernia repair. All bowel viable and patent post reduction of hernia and CARL

## 2017-12-28 NOTE — CONSULT NOTE ADULT - ASSESSMENT
I/p:  87 yo female S/P ventral hernia repair, CARL, h/o afib on xarelto, rosario vasc #5      Hold Xarelto until ok to resume per surgery  Heparin 5000 units sq q 12h for VTE prophylaxis  monitor CBC, BMP daily  BLE venodynes  INC mobility as alena I/p:  89 yo female S/P ventral hernia repair, CARL, h/o afib on xarelto, rosario vasc #5      Hold Xarelto until ok to resume per surgery  Heparin 5000 units sq q 12h for VTE prophylaxis  monitor CBC, BMP daily  BLE venodynes  INC mobility as alena  Thank you for the consult, will follow

## 2017-12-28 NOTE — H&P ADULT - HISTORY OF PRESENT ILLNESS
Pt seen and examined at bedside with chaperone. Pt is AAOx3, pt in no acute distress. Pt has c/o worsening abd pain, nausea, vomitting since 12/24/17. Pt denied c/o fever, chills, chest pain, SOB, extremity pain or dysfunction, hemoptysis, hematemesis, hematuria, hematochexia, headache, diplopia, vertigo, dizzyness.

## 2017-12-28 NOTE — CONSULT NOTE ADULT - SUBJECTIVE AND OBJECTIVE BOX
PMD: Dr. Soto [place] [phone]    CC: abd pain, N/V    HPI: 89 yo female with PMH of Paroxysmal A. fib, HTN, HLD, DM2, arthritis, GERD, colon CA presents to ED with complaint of abd pain, N/V since . Pt is visiting family here for the holidays. SHe has a history of colon cancer s/p resection. In ED CT scan of abdomen showed hig grade SBO within umbilical hernia. She underwent urgent surgery this morning for lysis of adhesion and repair of hernia with mesh with Dr. Lucas.     Currently pt resting comfortably. Feels soar around incision site. Has pope in place but decreased urine output (has only put out 25cc of dark urine for the past 1.5 hrs). Hypotensive. Feels tired. No chest pain or SOB.    PMH: as above  Home medications: actos 15mg daily, glipizide-metofromin 2.5-500mg daily, enalapril 10mg daily, tramadol 50mg TID PRN, simvastatin 10mg 2x/week, lasix 40mg PRN, xarelto 20mg daily, prilosec and zantac PRN  PSH: colon resection, left hip replacement  FH: non contributory  SH: denies tobacco, etoh or drug use  Allergies: PCN    ROS: negative unless stated above in HPI    Vital Signs Last 24 Hrs  T(C): 36.3 (28 Dec 2017 09:30), Max: 38 (27 Dec 2017 21:09)  T(F): 97.4 (28 Dec 2017 09:30), Max: 100.4 (27 Dec 2017 21:09)  HR: 70 (28 Dec 2017 12:01) (66 - 111)  BP: 83/36 (28 Dec 2017 12:01) (83/36 - 185/95)  BP(mean): 48 (28 Dec 2017 12:01) (48 - 53)  RR: 15 (28 Dec 2017 12:01) (14 - 22)  SpO2: 98% (28 Dec 2017 12:01) (92% - 100%)    PHYSICAL EXAM:    Constitutional: NAD, well-groomed, well-developed  HEENT: PERRLA, EOMI, Normal Hearing  Neck: No LAD, No JVD  Back: Normal spine flexure, No CVA tenderness  Cardiovascular: S1 and S2, RRR  Respiratory: CTAB  Gastrointestinal: soft, tenderness around incision site, dressing C/D/I  Extremities: No peripheral edema  Vascular: 2+ peripheral pulses  Neurological: A/O x 3, no focal deficits  Psychiatric: Normal mood, normal affect  Skin: No rashes    Labs    CARDIAC MARKERS ( 27 Dec 2017 21:06 )  0.055 ng/mL / x     / x     / x     / x                                14.0   17.6  )-----------( 239      ( 28 Dec 2017 07:25 )             44.0     28 Dec 2017 07:25    134    |  99     |  24     ----------------------------<  204    3.6     |  27     |  0.92     Ca    8.4        28 Dec 2017 07:25    TPro  7.9    /  Alb  3.4    /  TBili  1.2    /  DBili  x      /  AST  19     /  ALT  16     /  AlkPhos  95     27 Dec 2017 21:06    PT/INR - ( 27 Dec 2017 21:06 )   PT: 12.1 sec;   INR: 1.12 ratio         PTT - ( 27 Dec 2017 21:06 )  PTT:26.4 sec  CAPILLARY BLOOD GLUCOSE      POCT Blood Glucose.: 163 mg/dL (28 Dec 2017 11:45)    LIVER FUNCTIONS - ( 27 Dec 2017 21:06 )  Alb: 3.4 g/dL / Pro: 7.9 gm/dL / ALK PHOS: 95 U/L / ALT: 16 U/L / AST: 19 U/L / GGT: x           Urinalysis Basic - ( 27 Dec 2017 22:19 )    Color: Yellow / Appearance: Clear / S.025 / pH: x  Gluc: x / Ketone: Trace  / Bili: Negative / Urobili: 1 mg/dL   Blood: x / Protein: 100 mg/dL / Nitrite: Negative   Leuk Esterase: Trace / RBC: 6-10 /HPF / WBC 26-50   Sq Epi: x / Non Sq Epi: Occasional / Bacteria: Moderate      Hemoglobin A1C, Whole Blood: 6.0 % ( @ 07:25)    < from: CT Abdomen and Pelvis w/ Oral Cont and w/ IV Cont (17 @ 00:35) >  EXAM:  CT ABDOMEN AND PELVIS OC IC                            PROCEDURE DATE:  2017          INTERPRETATION:  EXAM: CT ABDOMEN AND PELVIS WITH CONTRAST    CLINICAL INFORMATION:  Nodular and vomiting.  No oral intake for several   days.  Rule out bowel obstruction.    TECHNIQUE:   Axial CT images were acquired through the abdomen and pelvis.  Intravenous contrast:  95 cc of Omnipaque-350 mg/ml were administered,   and 5 cc were discarded.  Oral contrast:  Positive oral contrast was administered.  Coronal and sagittal reformats were generated.     COMPARISON STUDY:  None.    FINDINGS:  Visualized lower chest: Heart appears mildly enlarged.  Atherosclerotic   calcification of the visualized coronary arteries.    Liver: Unremarkable.  Bile ducts: Normal caliber.  Gallbladder: No evidence of gallstones or acute cholecystitis.  Spleen: Multiple punctate calcifications in the spleen compatible with  Pancreas: Markedly atrophic.  Adrenal glands: Unremarkable.   Kidneys/ureters: Kidneys enhance symmetrically without hydronephrosis or   renal stones.  Subcentimeter hypoattenuating left renal lesions are too   small to characterize by CT scan.      Pelvic organs are partially treated by streak artifact from left hip   prosthesis.  Bladder: Unremarkable.  Reproductive organs: No evidence of uterine or adnexal mass.    Bowel: There is a 1.5 cm linear calcific versus metallic foreign body   abutting the anterior (anti-dependent) wall of the stomach (2:28-30).    There is no associated wall gastric wall thickening with surrounding   inflammatory changes.  There is high-grade small bowel obstruction with   single transition point at the level of an umbilical hernia.  No   associated bowel wall thickening or pneumatosis.  Peritoneum: Mild abdominal and pelvic ascites.  No free air or abscess.    Retroperitoneum: No lymphadenopathy.  Vasculature: Atherosclerotic calcifications of the aortoiliac tree.    Abdominal wall/soft tissues: Approximately 9 x 7 sending umbilical hernia   witha 2.5 cm wide neck contains obstructed small bowel as well as   collapsed distal small bowel loops.  Bones: Marked thoracolumbar scoliosis and multilevel degenerative changes   in the spine.  Moderate to severe spinal canal stenosis at L2-L3, L3-L4   and L4-L5.  A left hip prosthesis is partially visualized.      IMPRESSION:  High-grade small bowel obstruction with single transition point at the   level of an umbilical hernia.  Surgical consultation is recommended.    Underlying incarceration and/or bowel ischemia should be excluded   clinically.  There is no bowel wall thickening, pneumatosis or   mesenteric/portal venous gas.  No evidence of gastrointestinal   perforation or abscess/drainable fluid collection.    Additionally there is a 1.5cm linear calcific versus metallic foreign   body abutting the anterior (anti-dependent ) wall the stomach without   evidence of gastric wall thickening or surrounding inflammatory changes.    The umbilical hernia measures approximately approximate 9x 7 cm with a   2.5 cm wide neck and contains obstructed small bowel as well as collapsed   distal small bowel loops.    < end of copied text >    MEDICATIONS  (STANDING):  ciprofloxacin   IVPB      ciprofloxacin   IVPB 400 milliGRAM(s) IV Intermittent every 12 hours  dextrose 5%. 1000 milliLiter(s) (50 mL/Hr) IV Continuous <Continuous>  dextrose 50% Injectable 12.5 Gram(s) IV Push once  dextrose 50% Injectable 25 Gram(s) IV Push once  dextrose 50% Injectable 25 Gram(s) IV Push once  heparin  Injectable 5000 Unit(s) SubCutaneous every 12 hours  HYDROmorphone PCA (1 mG/mL) 30 milliLiter(s) PCA Continuous PCA Continuous  insulin lispro (HumaLOG) corrective regimen sliding scale   SubCutaneous three times a day before meals  metoprolol    tartrate Injectable 5 milliGRAM(s) IV Push every 6 hours  metroNIDAZOLE  IVPB      metroNIDAZOLE  IVPB 500 milliGRAM(s) IV Intermittent every 8 hours  pantoprazole  Injectable 40 milliGRAM(s) IV Push daily  sodium chloride 0.9% Bolus 1000 milliLiter(s) IV Bolus once  sodium chloride 0.9%. 1000 milliLiter(s) (75 mL/Hr) IV Continuous <Continuous>    MEDICATIONS  (PRN):  acetaminophen   Tablet 650 milliGRAM(s) Oral every 6 hours PRN For Temp greater than 38 C (100.4 F)  dextrose Gel 1 Dose(s) Oral once PRN Blood Glucose LESS THAN 70 milliGRAM(s)/deciliter  glucagon  Injectable 1 milliGRAM(s) IntraMuscular once PRN Glucose LESS THAN 70 milligrams/deciliter  HYDROmorphone PCA (1 mG/mL) Rescue Clinician Bolus 0.5 milliGRAM(s) IV Push every 15 minutes PRN for Pain Scale GREATER THAN 6  naloxone Injectable 0.1 milliGRAM(s) IV Push every 3 minutes PRN For ANY of the following changes in patient status:  A. RR LESS THAN 10 breaths per minute, B. Oxygen saturation LESS THAN 90%, C. Sedation score of 6  ondansetron Injectable 4 milliGRAM(s) IV Push every 6 hours PRN Nausea  ondansetron Injectable 4 milliGRAM(s) IV Push every 6 hours PRN Nausea PMD: Dr. Soto [place] [phone]    CC: abd pain, N/V    HPI: 89 yo female with PMH of Paroxysmal A. fib, HTN, HLD, DM2, arthritis, GERD, colon CA presents to ED with complaint of abd pain, N/V since . Pt is visiting family here for the holidays. SHe has a history of colon cancer s/p resection. In ED CT scan of abdomen showed hig grade SBO within umbilical hernia. She underwent urgent surgery this morning for lysis of adhesion and repair of hernia with mesh with Dr. Lucas.     Currently pt resting comfortably. Feels soar around incision site. Has pope in place but decreased urine output (has only put out 25cc of dark urine for the past 1.5 hrs). Hypotensive. Feels tired. No chest pain or SOB.    PMH: as above  Home medications: actos 15mg daily, glipizide-metofromin 2.5-500mg daily, lantus 12 units qhs, enalapril 10mg daily, tramadol 50mg TID PRN, simvastatin 10mg 2x/week, lasix 40mg PRN, xarelto 20mg daily, prilosec and zantac PRN  PSH: colon resection, left hip replacement  FH: non contributory  SH: denies tobacco, etoh or drug use  Allergies: PCN    ROS: negative unless stated above in HPI    Vital Signs Last 24 Hrs  T(C): 36.3 (28 Dec 2017 09:30), Max: 38 (27 Dec 2017 21:09)  T(F): 97.4 (28 Dec 2017 09:30), Max: 100.4 (27 Dec 2017 21:09)  HR: 70 (28 Dec 2017 12:01) (66 - 111)  BP: 83/36 (28 Dec 2017 12:01) (83/36 - 185/95)  BP(mean): 48 (28 Dec 2017 12:01) (48 - 53)  RR: 15 (28 Dec 2017 12:01) (14 - 22)  SpO2: 98% (28 Dec 2017 12:01) (92% - 100%)    PHYSICAL EXAM:    Constitutional: NAD, well-groomed, well-developed  HEENT: PERRLA, EOMI, Normal Hearing  Neck: No LAD, No JVD  Back: Normal spine flexure, No CVA tenderness  Cardiovascular: S1 and S2, RRR  Respiratory: CTAB  Gastrointestinal: soft, tenderness around incision site, dressing C/D/I  Extremities: No peripheral edema  Vascular: 2+ peripheral pulses  Neurological: A/O x 3, no focal deficits  Psychiatric: Normal mood, normal affect  Skin: No rashes    Labs    CARDIAC MARKERS ( 27 Dec 2017 21:06 )  0.055 ng/mL / x     / x     / x     / x                                14.0   17.6  )-----------( 239      ( 28 Dec 2017 07:25 )             44.0     28 Dec 2017 07:25    134    |  99     |  24     ----------------------------<  204    3.6     |  27     |  0.92     Ca    8.4        28 Dec 2017 07:25    TPro  7.9    /  Alb  3.4    /  TBili  1.2    /  DBili  x      /  AST  19     /  ALT  16     /  AlkPhos  95     27 Dec 2017 21:06    PT/INR - ( 27 Dec 2017 21:06 )   PT: 12.1 sec;   INR: 1.12 ratio         PTT - ( 27 Dec 2017 21:06 )  PTT:26.4 sec  CAPILLARY BLOOD GLUCOSE      POCT Blood Glucose.: 163 mg/dL (28 Dec 2017 11:45)    LIVER FUNCTIONS - ( 27 Dec 2017 21:06 )  Alb: 3.4 g/dL / Pro: 7.9 gm/dL / ALK PHOS: 95 U/L / ALT: 16 U/L / AST: 19 U/L / GGT: x           Urinalysis Basic - ( 27 Dec 2017 22:19 )    Color: Yellow / Appearance: Clear / S.025 / pH: x  Gluc: x / Ketone: Trace  / Bili: Negative / Urobili: 1 mg/dL   Blood: x / Protein: 100 mg/dL / Nitrite: Negative   Leuk Esterase: Trace / RBC: 6-10 /HPF / WBC 26-50   Sq Epi: x / Non Sq Epi: Occasional / Bacteria: Moderate      Hemoglobin A1C, Whole Blood: 6.0 % ( @ 07:25)    < from: CT Abdomen and Pelvis w/ Oral Cont and w/ IV Cont (17 @ 00:35) >  EXAM:  CT ABDOMEN AND PELVIS OC IC                            PROCEDURE DATE:  2017          INTERPRETATION:  EXAM: CT ABDOMEN AND PELVIS WITH CONTRAST    CLINICAL INFORMATION:  Nodular and vomiting.  No oral intake for several   days.  Rule out bowel obstruction.    TECHNIQUE:   Axial CT images were acquired through the abdomen and pelvis.  Intravenous contrast:  95 cc of Omnipaque-350 mg/ml were administered,   and 5 cc were discarded.  Oral contrast:  Positive oral contrast was administered.  Coronal and sagittal reformats were generated.     COMPARISON STUDY:  None.    FINDINGS:  Visualized lower chest: Heart appears mildly enlarged.  Atherosclerotic   calcification of the visualized coronary arteries.    Liver: Unremarkable.  Bile ducts: Normal caliber.  Gallbladder: No evidence of gallstones or acute cholecystitis.  Spleen: Multiple punctate calcifications in the spleen compatible with  Pancreas: Markedly atrophic.  Adrenal glands: Unremarkable.   Kidneys/ureters: Kidneys enhance symmetrically without hydronephrosis or   renal stones.  Subcentimeter hypoattenuating left renal lesions are too   small to characterize by CT scan.      Pelvic organs are partially treated by streak artifact from left hip   prosthesis.  Bladder: Unremarkable.  Reproductive organs: No evidence of uterine or adnexal mass.    Bowel: There is a 1.5 cm linear calcific versus metallic foreign body   abutting the anterior (anti-dependent) wall of the stomach (2:28-30).    There is no associated wall gastric wall thickening with surrounding   inflammatory changes.  There is high-grade small bowel obstruction with   single transition point at the level of an umbilical hernia.  No   associated bowel wall thickening or pneumatosis.  Peritoneum: Mild abdominal and pelvic ascites.  No free air or abscess.    Retroperitoneum: No lymphadenopathy.  Vasculature: Atherosclerotic calcifications of the aortoiliac tree.    Abdominal wall/soft tissues: Approximately 9 x 7 sending umbilical hernia   witha 2.5 cm wide neck contains obstructed small bowel as well as   collapsed distal small bowel loops.  Bones: Marked thoracolumbar scoliosis and multilevel degenerative changes   in the spine.  Moderate to severe spinal canal stenosis at L2-L3, L3-L4   and L4-L5.  A left hip prosthesis is partially visualized.      IMPRESSION:  High-grade small bowel obstruction with single transition point at the   level of an umbilical hernia.  Surgical consultation is recommended.    Underlying incarceration and/or bowel ischemia should be excluded   clinically.  There is no bowel wall thickening, pneumatosis or   mesenteric/portal venous gas.  No evidence of gastrointestinal   perforation or abscess/drainable fluid collection.    Additionally there is a 1.5cm linear calcific versus metallic foreign   body abutting the anterior (anti-dependent ) wall the stomach without   evidence of gastric wall thickening or surrounding inflammatory changes.    The umbilical hernia measures approximately approximate 9x 7 cm with a   2.5 cm wide neck and contains obstructed small bowel as well as collapsed   distal small bowel loops.    < end of copied text >    MEDICATIONS  (STANDING):  ciprofloxacin   IVPB      ciprofloxacin   IVPB 400 milliGRAM(s) IV Intermittent every 12 hours  dextrose 5%. 1000 milliLiter(s) (50 mL/Hr) IV Continuous <Continuous>  dextrose 50% Injectable 12.5 Gram(s) IV Push once  dextrose 50% Injectable 25 Gram(s) IV Push once  dextrose 50% Injectable 25 Gram(s) IV Push once  heparin  Injectable 5000 Unit(s) SubCutaneous every 12 hours  HYDROmorphone PCA (1 mG/mL) 30 milliLiter(s) PCA Continuous PCA Continuous  insulin lispro (HumaLOG) corrective regimen sliding scale   SubCutaneous three times a day before meals  metoprolol    tartrate Injectable 5 milliGRAM(s) IV Push every 6 hours  metroNIDAZOLE  IVPB      metroNIDAZOLE  IVPB 500 milliGRAM(s) IV Intermittent every 8 hours  pantoprazole  Injectable 40 milliGRAM(s) IV Push daily  sodium chloride 0.9% Bolus 1000 milliLiter(s) IV Bolus once  sodium chloride 0.9%. 1000 milliLiter(s) (75 mL/Hr) IV Continuous <Continuous>    MEDICATIONS  (PRN):  acetaminophen   Tablet 650 milliGRAM(s) Oral every 6 hours PRN For Temp greater than 38 C (100.4 F)  dextrose Gel 1 Dose(s) Oral once PRN Blood Glucose LESS THAN 70 milliGRAM(s)/deciliter  glucagon  Injectable 1 milliGRAM(s) IntraMuscular once PRN Glucose LESS THAN 70 milligrams/deciliter  HYDROmorphone PCA (1 mG/mL) Rescue Clinician Bolus 0.5 milliGRAM(s) IV Push every 15 minutes PRN for Pain Scale GREATER THAN 6  naloxone Injectable 0.1 milliGRAM(s) IV Push every 3 minutes PRN For ANY of the following changes in patient status:  A. RR LESS THAN 10 breaths per minute, B. Oxygen saturation LESS THAN 90%, C. Sedation score of 6  ondansetron Injectable 4 milliGRAM(s) IV Push every 6 hours PRN Nausea  ondansetron Injectable 4 milliGRAM(s) IV Push every 6 hours PRN Nausea

## 2017-12-28 NOTE — PROGRESS NOTE ADULT - SUBJECTIVE AND OBJECTIVE BOX
Patient is a 88y old  Female who presents with a chief complaint of abd pain, nausea, vomitting since 12/24/17 (28 Dec 2017 03:48)      HPI:  Pt seen and examined at bedside with chaperone. Pt is AAOx3, pt in no acute distress. Pt has c/o worsening abd pain, nausea, vomitting since 12/24/17. Pt denied c/o fever, chills, chest pain, SOB, extremity pain or dysfunction, hemoptysis, hematemesis, hematuria, hematochexia, headache, diplopia, vertigo, dizzyness. (28 Dec 2017 03:48)  12/28: Pt seen and examined, feeling week, low grade fever, no nausea, NO GI function. No SOB, No chest pain. Post op pain controlled with meds. Low urine out put, receiving 1 liter bolus.  All other system review is negative .  PAST MEDICAL & SURGICAL HISTORY:  Hyperlipidemia, unspecified hyperlipidemia type  Chronic atrial fibrillation  Type 2 diabetes mellitus without complication, unspecified long term insulin use status  Hypertension, unspecified type  History of left hip replacement  History of colon resection    FAMILY HISTORY:  No pertinent family history in first degree relatives      Alcohol: Denied  Smoking: Denied  Drug Use: Denied        Vital Signs Last 24 Hrs  T(C): 36.1 (28 Dec 2017 16:49), Max: 38 (27 Dec 2017 21:09)  T(F): 97 (28 Dec 2017 16:49), Max: 100.4 (27 Dec 2017 21:09)  HR: 71 (28 Dec 2017 18:01) (64 - 111)  BP: 102/50 (28 Dec 2017 18:01) (81/30 - 185/95)  BP(mean): 61 (28 Dec 2017 18:01) (40 - 61)  RR: 19 (28 Dec 2017 18:01) (13 - 23)  SpO2: 100% (28 Dec 2017 18:01) (92% - 100%)    I&O's Summary    27 Dec 2017 07:01  -  28 Dec 2017 07:00  --------------------------------------------------------  IN: 0 mL / OUT: 1000 mL / NET: -1000 mL    28 Dec 2017 07:01  -  28 Dec 2017 19:32  --------------------------------------------------------  IN: 1200 mL / OUT: 965 mL / NET: 235 mL        PHYSICAL EXAM:  Constitutional: NAD  Eyes:  WNL  ENMT:  WNL  Neck:  WNL, non tender  Back: Non tender  Respiratory: CTABL  Cardiovascular:  S1+S2+0  Gastrointestinal: Soft, ND, appropriately tender, dressing CDI.  Genitourinary:  WNL  Extremities: NV intact  Vascular:  Intact  Neurological: No focal neurological deficit,  CN, motor and sensory system grossly intact.  Skin: WNL  Musculoskeletal: WNL  Psychiatric: Grossly WNL        Labs:                          12.7   9.9   )-----------( 167      ( 28 Dec 2017 16:19 )             39.8       12-28    135  |  103  |  30<H>  ----------------------------<  150<H>  4.0   |  22  |  1.03    Ca    7.8<L>      28 Dec 2017 16:19  Mg     1.6     12-28    TPro  7.9  /  Alb  3.4  /  TBili  1.2  /  DBili  x   /  AST  19  /  ALT  16  /  AlkPhos  95  12-27      PT/INR - ( 27 Dec 2017 21:06 )   PT: 12.1 sec;   INR: 1.12 ratio         PTT - ( 27 Dec 2017 21:06 )  PTT:26.4 sec

## 2017-12-28 NOTE — H&P ADULT - NSHPLABSRESULTS_GEN_ALL_CORE
Vital Signs Last 24 Hrs  T(C): 38 (27 Dec 2017 21:09), Max: 38 (27 Dec 2017 21:09)  T(F): 100.4 (27 Dec 2017 21:09), Max: 100.4 (27 Dec 2017 21:09)  HR: 104 (28 Dec 2017 03:15) (84 - 111)  BP: 148/68 (28 Dec 2017 03:15) (122/74 - 185/95)  BP(mean): --  RR: 18 (28 Dec 2017 03:15) (18 - 22)  SpO2: 95% (28 Dec 2017 03:15) (92% - 97%)    Labs:    CARDIAC MARKERS ( 27 Dec 2017 21:06 )  0.055 ng/mL / x     / x     / x     / x                           15.5   17.8  )-----------( 306      ( 27 Dec 2017 21:06 )             48.1     CBC Full  -  ( 27 Dec 2017 21:06 )  WBC Count : 17.8 K/uL  Hemoglobin : 15.5 g/dL  Hematocrit : 48.1 %  Platelet Count - Automated : 306 K/uL  Mean Cell Volume : 87.4 fl  Mean Cell Hemoglobin : 28.1 pg  Mean Cell Hemoglobin Concentration : 32.2 gm/dL  Auto Neutrophil # : 15.6 K/uL  Auto Lymphocyte # : 1.0 K/uL  Auto Monocyte # : 1.1 K/uL  Auto Eosinophil # : 0.0 K/uL  Auto Basophil # : 0.0 K/uL  Auto Neutrophil % : 87.9 %  Auto Lymphocyte % : 5.9 %  Auto Monocyte % : 6.1 %  Auto Eosinophil % : 0.0 %  Auto Basophil % : 0.2 %    12-27    133<L>  |  97  |  21  ----------------------------<  252<H>  4.0   |  26  |  0.99    Ca    9.5      27 Dec 2017 21:06    TPro  7.9  /  Alb  3.4  /  TBili  1.2  /  DBili  x   /  AST  19  /  ALT  16  /  AlkPhos  95  12-27    LIVER FUNCTIONS - ( 27 Dec 2017 21:06 )  Alb: 3.4 g/dL / Pro: 7.9 gm/dL / ALK PHOS: 95 U/L / ALT: 16 U/L / AST: 19 U/L / GGT: x           PT/INR - ( 27 Dec 2017 21:06 )   PT: 12.1 sec;   INR: 1.12 ratio    PTT - ( 27 Dec 2017 21:06 )  PTT:26.4 sec    Radiology:    EXAM:  CT ABDOMEN AND PELVIS OC IC                          PROCEDURE DATE:  12/28/2017      INTERPRETATION:  EXAM: CT ABDOMEN AND PELVIS WITH CONTRAST    CLINICAL INFORMATION:  Nodular and vomiting.  No oral intake for several   days.  Rule out bowel obstruction.    TECHNIQUE:   Axial CT images were acquired through the abdomen and pelvis.  Intravenous contrast:  95 cc of Omnipaque-350 mg/ml were administered,   and 5 cc were discarded.  Oral contrast:  Positive oral contrast was administered.  Coronal and sagittal reformats were generated.     COMPARISON STUDY:  None.    IMPRESSION:  High-grade small bowel obstruction with single transition point at the   level of an umbilical hernia.  Surgical consultation is recommended.    Underlying incarceration and/or bowel ischemia should be excluded   clinically.  There is no bowel wall thickening, pneumatosis or   mesenteric/portal venous gas.  No evidence of gastrointestinal   perforation or abscess/drainable fluid collection.    Additionally there is a 1.5cm linear calcific versus metallic foreign   body abutting the anterior (anti-dependent ) wall the stomach without   evidence of gastric wall thickening or surrounding inflammatory changes.    The umbilical hernia measures approximately approximate 9x 7 cm with a   2.5 cm wide neck and contains obstructed small bowel as well as collapsed   distal small bowel loops.

## 2017-12-28 NOTE — H&P ADULT - PMH
Chronic atrial fibrillation    Colon cancer    Hyperlipidemia, unspecified hyperlipidemia type    Hypertension, unspecified type    Type 2 diabetes mellitus without complication, unspecified long term insulin use status

## 2017-12-28 NOTE — CONSULT NOTE ADULT - PROBLEM SELECTOR RECOMMENDATION 9
Rate is adequately controlled without AVN slowing agents; hold anticoagulation until ok to resume from surgical standpoint (anticipate holding x several days).

## 2017-12-28 NOTE — H&P ADULT - NSHPPHYSICALEXAM_GEN_ALL_CORE
Pt is AAOx3  Pt in no acute distress  CNII-XII grossly intact   HEENT: Normocephalic, atraumatic, SCOTTY, EOM wnl  Neck: No crepitus, no ecchymosis, no hematoma, to exam, no JVD, no tracheal deviation  Cardiovascular: S1S2 Present  Respiratory: Respiratory Effort normal; no wheezes, rales or rhonchi to exam, CTAB  ABD: bowel sounds (+), soft, (+) diffuse tenderness to exam, (+) distended, no rebound, no guarding, no rigidity, no skin changes to exam. No pelvic instability to exam, no skin changes, negative maynard's sign to exam. (+) incarcerated pewriumbilical incisional ventral hernia with tenderness  Musculoskeletal: All digits are warm and well perfused. Pt demonstrates grossly intact sensoromotor function. Pt has good capillary refill to digits, no calf edema or tenderness to exam.  Skin: no jaundice or icteric sclera to exam b/l, no skin changes to exam

## 2017-12-28 NOTE — H&P ADULT - ASSESSMENT
A/P:  Incarcerated r/o strangulated incisional ventral hernia  High grade small bowel obstruction  IV antibiotics  NPO, IV hydration  GI/DVT prophylaxis  Pain control  Incentive spirometry  Serial abd exams  F/U labs  Kcentra administration for Xarelto reversal    Pt seen and examined at bedside with chaperone/family. Pt is AAOx3, pt in no acute distress. Pt has incarcerated r/o strangulated incisional/ventral hernia and high grade small bowel obstruction. Pt explained the surgical procedures in both medical terminology and in lay terms that the patient understood, exploratory laparotomy, possible bowel resection, possible ostomy, possible repair of ventral hernia. Pt explained in both medical terminology and in lay terms that the patient understood, the benefits and alternatives of surgery including non-operative management. Pt explained at length in both medical terminology and in lay terms that the patient understood, the associated risks of surgery including but not limited to infection, bleeding, nerve/organ injury, postoperative pain, poor wound healing, scar formation both internally and externally, risk of seroma/hematoma/abcess formation, risk of hernia development/recurrence, risk of anastamotic leak or breakdown, risk of need for further GI/IR/Surgery intervention as required. Pt explained in both medical terminology and in lay terms that the patient understood, the associated amparo and post operative risks of cardiac/cns/respiratory insult or injury, risk of death. Pt understood all of the above. All questions were answered. Pt gave informed consent for surgery.    Pt aware of and agrees with all of the above

## 2017-12-28 NOTE — CONSULT NOTE ADULT - PROBLEM SELECTOR RECOMMENDATION 2
There is a history of HTN - mild hypotension and decreased urine output at present time -- would treat with IVF; hold ACE-I and furosemide.

## 2017-12-28 NOTE — BRIEF OPERATIVE NOTE - POST-OP DX
Incisional hernia with bowel obstruction  12/28/2017    Active  Orlando Lucas  Peritoneal adhesions (postprocedural) (postinfection)  12/28/2017    Active  Orlando Lucas

## 2017-12-28 NOTE — CONSULT NOTE ADULT - SUBJECTIVE AND OBJECTIVE BOX
CHIEF COMPLAINT: Patient is a 88y old  Female who presents with a chief complaint of abd pain, nausea, vomitting since 12/24/17 (28 Dec 2017 03:48)    HPI:  88 year old woman with a history of chronic AF (anticoagulated with Xarelto), HTN, HLD colon cancer presented to the ER on 12/27 with weakness, abdominal pain, nausea, and vomiting; she was diagnosed with acute small bowel obstruction.  She went to the OR for exploratory laparotomy / lysis of adhesions.  Cardiology consult requested for assistance in managing her chronic heart disease.  There is no history of ischemic heart disease or CHF.  She describes stable, chronic AF; good functional status.    PAST MEDICAL & SURGICAL HISTORY:  Hyperlipidemia, unspecified hyperlipidemia type  Chronic atrial fibrillation  Type 2 diabetes mellitus without complication, unspecified long term insulin use status  Hypertension, unspecified type  History of left hip replacement  History of colon resection    SOCIAL HISTORY:   Alcohol: Denied  Smoking: Nonsmoker    FAMILY HISTORY: FAMILY HISTORY:  No pertinent family history in first degree relatives    MEDICATIONS  (STANDING):   ciprofloxacin   IVPB 400 milliGRAM(s) IV Intermittent every 12 hours  heparin  Injectable 5000 Unit(s) SubCutaneous every 12 hours  HYDROmorphone PCA (1 mG/mL) 30 milliLiter(s) PCA Continuous PCA Continuous  insulin lispro (HumaLOG) corrective regimen sliding scale   SubCutaneous three times a day before meals  metoprolol    tartrate Injectable 5 milliGRAM(s) IV Push every 6 hours  metroNIDAZOLE  IVPB 500 milliGRAM(s) IV Intermittent every 8 hours  pantoprazole  Injectable 40 milliGRAM(s) IV Push daily  sodium chloride 0.9% Bolus 1000 milliLiter(s) IV Bolus once  sodium chloride 0.9%. 1000 milliLiter(s) (75 mL/Hr) IV Continuous <Continuous>    MEDICATIONS  (PRN):  acetaminophen   Tablet 650 milliGRAM(s) Oral every 6 hours PRN For Temp greater than 38 C (100.4 F)  dextrose Gel 1 Dose(s) Oral once PRN Blood Glucose LESS THAN 70 milliGRAM(s)/deciliter  glucagon  Injectable 1 milliGRAM(s) IntraMuscular once PRN Glucose LESS THAN 70 milligrams/deciliter  HYDROmorphone PCA (1 mG/mL) Rescue Clinician Bolus 0.5 milliGRAM(s) IV Push every 15 minutes PRN for Pain Scale GREATER THAN 6  naloxone Injectable 0.1 milliGRAM(s) IV Push every 3 minutes PRN For ANY of the following changes in patient status:  A. RR LESS THAN 10 breaths per minute, B. Oxygen saturation LESS THAN 90%, C. Sedation score of 6  ondansetron Injectable 4 milliGRAM(s) IV Push every 6 hours PRN Nausea  ondansetron Injectable 4 milliGRAM(s) IV Push every 6 hours PRN Nausea    Allergies: No Known Allergies    REVIEW OF SYSTEMS:  CONSTITUTIONAL: + generalized weakness  EYES/ENT: No visual changes;  No throat pain   NECK: No pain or stiffness  RESPIRATORY: No cough, wheezing, hemoptysis; No shortness of breath  CARDIOVASCULAR: No chest pain or palpitations  GASTROINTESTINAL: + abdominal pain. + nausea, vomiting, or hematemesis.  GENITOURINARY: No dysuria, frequency or hematuria  NEUROLOGICAL: No numbness.  SKIN: No itching or rash  All other review of systems is negative unless indicated above    VITAL SIGNS:   Vital Signs Last 24 Hrs  T(C): 36.3 (28 Dec 2017 09:30), Max: 38 (27 Dec 2017 21:09)  T(F): 97.4 (28 Dec 2017 09:30), Max: 100.4 (27 Dec 2017 21:09)  HR: 70 (28 Dec 2017 12:01) (66 - 111)  BP: 83/36 (28 Dec 2017 12:01) (83/36 - 185/95)  RR: 15 (28 Dec 2017 12:01) (14 - 22)  SpO2: 98% (28 Dec 2017 12:01) (92% - 100%)    PHYSICAL EXAM:  Constitutional: Supine/flat in bed, awake and alert but drowsy  HEENT:  No oral cyanosis. NGT in nostril  Pulmonary: Non-labored, breath sounds are clear bilaterally, No wheezing, rales or rhonchi  Cardiovascular: Irregularly irregular, normal S2  Gastrointestinal: Abdomen is soft, incisional tenderness   Lymph: No peripheral edema. No cervical lymphadenopathy.  Neurological: Alert, no focal deficits  Skin: No rash.  Psych:  Mood & affect appropriate    LABS:                    14.0   17.6  )-----------( 239      ( 28 Dec 2017 07:25 )             44.0              134    |  99     |  24     ----------------------------<  204    3.6     |  27     |  0.92     Ca    9.5        27 Dec 2017 21:06  TPro  7.9    /  Alb  3.4    /  TBili  1.2    /  DBili  x      /  AST  19     /  ALT  16     /  AlkPhos  95     27 Dec 2017 21:06  PT/INR - ( 27 Dec 2017 21:06 )   PT: 12.1 sec;   INR: 1.12 ratio    PTT - ( 27 Dec 2017 21:06 )  PTT:26.4 sec    CARDIAC MARKERS ( 27 Dec 2017 21:06 ) 0.055 ng/mL / x     / x     / x     / x        Tele: AF    ECG: AF, low voltage QRS, nonspecific ST/T abnormalities

## 2017-12-28 NOTE — BRIEF OPERATIVE NOTE - PRE-OP DX
Incisional hernia with obstruction  12/28/2017    Active  Orlando Lucas  Peritoneal adhesions  12/28/2017    Active  Orlando Lucas

## 2017-12-28 NOTE — ED ADULT NURSE REASSESSMENT NOTE - NS ED NURSE REASSESS COMMENT FT1
pt resting comfortably, medicated as per MAR. CT results pending. pt safety maintained, will continue to monitor.
pt in no distress. medicated as per MAR. awaiting CT scan. safety maintained, will continue to monitor

## 2017-12-28 NOTE — PROGRESS NOTE ADULT - ASSESSMENT
88 Y old female S/P ex lap, CARL, repair of incarcerated  ventral hernia POD 0, slightly decreased urine out put    NPO, NGT  IV hydration  Strict IS/OS  Trend labs  DVT /GI prophylaxis  Await GI function  Medical consult  Cardiology consult  Hold A/C for now  PT

## 2017-12-28 NOTE — PROGRESS NOTE ADULT - SUBJECTIVE AND OBJECTIVE BOX
Pt advised and offered surgical intervention of exploratory laparotomy, possible bowel resection, possible ostomy, possible repair of ventral hernia for incarcerated r/o strangulated ventral hernia and high grade small bowel obstruction. Pt explained the surgical procedures in both medical terminology and in lay terms that the patient understood. Pt explained in both medical terminology and in lay terms that the patient understood, the benefits and alternatives of surgery including non operative management. Pt explained at length in both medical terminology and in lay terms that the patient understood, the associated risks of surgery. Pt understood all of the above. All questions were answered. Pt DOES NOT want to have surgery or consent to any surgery at this time, until her family is bedside. Pt explained in both medical terminology and in lay terms that the patient understood, the associated risks of the decision to not have surgery including worsening pathology, risk of perforation, risk of sepsis, risk of septic shock and death. Pt understood all of the above, all questions were answered. Pt DOES NOT want to consent to any surgical intervention until her family is bedside. Spoke via telephone with pt's family, Farida Garland, who is coming to hospital with pt's son Galindo Garland.

## 2017-12-28 NOTE — CONSULT NOTE ADULT - ASSESSMENT
89 yo female with PMH of Paroxysmal A. fib, HTN, HLD, DM2, arthritis, GERD, colon CA presents to ED with complaint of abd pain, N/V since 12/24. Pt is visiting family here for the holidays. SHe has a history of colon cancer s/p resection. In ED CT scan of abdomen showed hig grade SBO within umbilical hernia. She underwent urgent surgery this morning for lysis of adhesion and repair of hernia with mesh with Dr. Lucas.     #SBO s/p lysis of adhesions and repair of umbilical hernia POD#0  - management as per primary surgery team  - pain control - on dilaudid PCA currently  - NPO, advance as per per surgery  - incentive spirometry    #Post op hypotension  - pope in place for strict I/Os  - bolus 1L NS now  - continue maintenance fluids    #Leukocytosis - likely related to incarcerated hernia  - CXR negative  - abx as per surgery team - cipro and flagyl  - monitor CBC    #Paroxysmal A. fib  - currently in sinus rhythm  - not on rate control at home  - lopressor 5mg q6h until able to take PO  - hold xarelto until ok with surgery    #DM2  - check HbA1c  - hold actos and glipizide/metformin  - ISS    #HTN  - hold enalapril until BP stabilized    #HLD  - check lipids  - restart statin when ok with PO    #GERD  - on protonix    #DVT prophylaxis  - as per surgery, on heparin sq 89 yo female with PMH of Paroxysmal A. fib, HTN, HLD, DM2, arthritis, GERD, colon CA presents to ED with complaint of abd pain, N/V since 12/24. Pt is visiting family here for the holidays. SHe has a history of colon cancer s/p resection. In ED CT scan of abdomen showed hig grade SBO within umbilical hernia. She underwent urgent surgery this morning for lysis of adhesion and repair of hernia with mesh with Dr. Lucas.     #SBO s/p lysis of adhesions and repair of umbilical hernia POD#0  - management as per primary surgery team  - pain control - on dilaudid PCA currently  - NPO, advance as per per surgery  - incentive spirometry    #Post op hypotension  - pope in place for strict I/Os  - bolus 1L NS now  - continue maintenance fluids    #Leukocytosis - likely related to incarcerated hernia  - CXR negative  - abx as per surgery team - cipro and flagyl  - monitor CBC    #Paroxysmal A. fib  - not on rate control at home  - lopressor 5mg q6h until able to take PO as BP tolerates  - hold xarelto until ok with surgery    #DM2  - check HbA1c  - hold actos and glipizide/metformin  - ISS  - hold lantus until pt tolerating diet    #HTN  - hold enalapril until BP stabilized    #HLD  - check lipids  - restart statin when ok with PO    #GERD  - on protonix    #DVT prophylaxis  - as per surgery, on heparin sq

## 2017-12-28 NOTE — BRIEF OPERATIVE NOTE - PROCEDURE
<<-----Click on this checkbox to enter Procedure Exploration of abdomen  12/28/2017    Active  ESTELA  Lysis of adhesions  12/28/2017    Active  ESTELA  Incisional hernia repair with graft  12/28/2017    Deyvi PALMER

## 2017-12-29 DIAGNOSIS — K91.89 OTHER POSTPROCEDURAL COMPLICATIONS AND DISORDERS OF DIGESTIVE SYSTEM: ICD-10-CM

## 2017-12-29 LAB
ANION GAP SERPL CALC-SCNC: 6 MMOL/L — SIGNIFICANT CHANGE UP (ref 5–17)
BASOPHILS # BLD AUTO: 0.1 K/UL — SIGNIFICANT CHANGE UP (ref 0–0.2)
BASOPHILS NFR BLD AUTO: 0.6 % — SIGNIFICANT CHANGE UP (ref 0–2)
BUN SERPL-MCNC: 32 MG/DL — HIGH (ref 7–23)
CALCIUM SERPL-MCNC: 7.9 MG/DL — LOW (ref 8.5–10.1)
CHLORIDE SERPL-SCNC: 105 MMOL/L — SIGNIFICANT CHANGE UP (ref 96–108)
CO2 SERPL-SCNC: 26 MMOL/L — SIGNIFICANT CHANGE UP (ref 22–31)
CREAT SERPL-MCNC: 0.96 MG/DL — SIGNIFICANT CHANGE UP (ref 0.5–1.3)
CULTURE RESULTS: SIGNIFICANT CHANGE UP
EOSINOPHIL # BLD AUTO: 0.1 K/UL — SIGNIFICANT CHANGE UP (ref 0–0.5)
EOSINOPHIL NFR BLD AUTO: 1 % — SIGNIFICANT CHANGE UP (ref 0–6)
GLUCOSE BLDC GLUCOMTR-MCNC: 170 MG/DL — HIGH (ref 70–99)
GLUCOSE BLDC GLUCOMTR-MCNC: 175 MG/DL — HIGH (ref 70–99)
GLUCOSE BLDC GLUCOMTR-MCNC: 190 MG/DL — HIGH (ref 70–99)
GLUCOSE BLDC GLUCOMTR-MCNC: 193 MG/DL — HIGH (ref 70–99)
GLUCOSE SERPL-MCNC: 181 MG/DL — HIGH (ref 70–99)
HBA1C BLD-MCNC: 6 % — HIGH (ref 4–5.6)
HCT VFR BLD CALC: 37.3 % — SIGNIFICANT CHANGE UP (ref 34.5–45)
HGB BLD-MCNC: 11.9 G/DL — SIGNIFICANT CHANGE UP (ref 11.5–15.5)
LYMPHOCYTES # BLD AUTO: 1.3 K/UL — SIGNIFICANT CHANGE UP (ref 1–3.3)
LYMPHOCYTES # BLD AUTO: 13.6 % — SIGNIFICANT CHANGE UP (ref 13–44)
MCHC RBC-ENTMCNC: 28.7 PG — SIGNIFICANT CHANGE UP (ref 27–34)
MCHC RBC-ENTMCNC: 31.9 GM/DL — LOW (ref 32–36)
MCV RBC AUTO: 89.9 FL — SIGNIFICANT CHANGE UP (ref 80–100)
MONOCYTES # BLD AUTO: 1.1 K/UL — HIGH (ref 0–0.9)
MONOCYTES NFR BLD AUTO: 12 % — SIGNIFICANT CHANGE UP (ref 2–14)
NEUTROPHILS # BLD AUTO: 6.8 K/UL — SIGNIFICANT CHANGE UP (ref 1.8–7.4)
NEUTROPHILS NFR BLD AUTO: 72.7 % — SIGNIFICANT CHANGE UP (ref 43–77)
PLATELET # BLD AUTO: 172 K/UL — SIGNIFICANT CHANGE UP (ref 150–400)
POTASSIUM SERPL-MCNC: 4.1 MMOL/L — SIGNIFICANT CHANGE UP (ref 3.5–5.3)
POTASSIUM SERPL-SCNC: 4.1 MMOL/L — SIGNIFICANT CHANGE UP (ref 3.5–5.3)
RBC # BLD: 4.15 M/UL — SIGNIFICANT CHANGE UP (ref 3.8–5.2)
RBC # FLD: 15.1 % — HIGH (ref 10.3–14.5)
SODIUM SERPL-SCNC: 137 MMOL/L — SIGNIFICANT CHANGE UP (ref 135–145)
SPECIMEN SOURCE: SIGNIFICANT CHANGE UP
SURGICAL PATHOLOGY FINAL REPORT - CH: SIGNIFICANT CHANGE UP
WBC # BLD: 9.3 K/UL — SIGNIFICANT CHANGE UP (ref 3.8–10.5)
WBC # FLD AUTO: 9.3 K/UL — SIGNIFICANT CHANGE UP (ref 3.8–10.5)

## 2017-12-29 PROCEDURE — 99233 SBSQ HOSP IP/OBS HIGH 50: CPT

## 2017-12-29 RX ORDER — IPRATROPIUM BROMIDE 0.2 MG/ML
1 SOLUTION, NON-ORAL INHALATION EVERY 6 HOURS
Qty: 0 | Refills: 0 | Status: DISCONTINUED | OUTPATIENT
Start: 2017-12-29 | End: 2017-12-29

## 2017-12-29 RX ORDER — INSULIN LISPRO 100/ML
2 VIAL (ML) SUBCUTANEOUS ONCE
Qty: 0 | Refills: 0 | Status: COMPLETED | OUTPATIENT
Start: 2017-12-29 | End: 2017-12-29

## 2017-12-29 RX ORDER — FUROSEMIDE 40 MG
1 TABLET ORAL
Qty: 0 | Refills: 0 | COMMUNITY

## 2017-12-29 RX ORDER — BENZOCAINE AND MENTHOL 5; 1 G/100ML; G/100ML
1 LIQUID ORAL EVERY 6 HOURS
Qty: 0 | Refills: 0 | Status: DISCONTINUED | OUTPATIENT
Start: 2017-12-29 | End: 2018-01-03

## 2017-12-29 RX ORDER — INSULIN LISPRO 100/ML
VIAL (ML) SUBCUTANEOUS EVERY 6 HOURS
Qty: 0 | Refills: 0 | Status: DISCONTINUED | OUTPATIENT
Start: 2017-12-29 | End: 2017-12-30

## 2017-12-29 RX ORDER — EPINEPHRINE 11.25MG/ML
0.5 SOLUTION, NON-ORAL INHALATION ONCE
Qty: 0 | Refills: 0 | Status: COMPLETED | OUTPATIENT
Start: 2017-12-29 | End: 2017-12-29

## 2017-12-29 RX ORDER — ACETAMINOPHEN 500 MG
1000 TABLET ORAL EVERY 8 HOURS
Qty: 0 | Refills: 0 | Status: COMPLETED | OUTPATIENT
Start: 2017-12-29 | End: 2017-12-29

## 2017-12-29 RX ORDER — RANITIDINE HYDROCHLORIDE 150 MG/1
1 TABLET, FILM COATED ORAL
Qty: 0 | Refills: 0 | COMMUNITY

## 2017-12-29 RX ADMIN — Medication 5 MILLIGRAM(S): at 17:39

## 2017-12-29 RX ADMIN — Medication 2: at 06:36

## 2017-12-29 RX ADMIN — HEPARIN SODIUM 5000 UNIT(S): 5000 INJECTION INTRAVENOUS; SUBCUTANEOUS at 06:16

## 2017-12-29 RX ADMIN — HEPARIN SODIUM 5000 UNIT(S): 5000 INJECTION INTRAVENOUS; SUBCUTANEOUS at 17:38

## 2017-12-29 RX ADMIN — PANTOPRAZOLE SODIUM 40 MILLIGRAM(S): 20 TABLET, DELAYED RELEASE ORAL at 12:12

## 2017-12-29 RX ADMIN — BENZOCAINE AND MENTHOL 1 LOZENGE: 5; 1 LIQUID ORAL at 12:15

## 2017-12-29 RX ADMIN — Medication 400 MILLIGRAM(S): at 17:37

## 2017-12-29 RX ADMIN — Medication 2 UNIT(S): at 00:55

## 2017-12-29 RX ADMIN — Medication 0.5 MILLILITER(S): at 16:35

## 2017-12-29 RX ADMIN — BENZOCAINE AND MENTHOL 1 LOZENGE: 5; 1 LIQUID ORAL at 23:24

## 2017-12-29 RX ADMIN — Medication 5 MILLIGRAM(S): at 13:19

## 2017-12-29 RX ADMIN — Medication 2: at 12:11

## 2017-12-29 RX ADMIN — DEXTROSE MONOHYDRATE, SODIUM CHLORIDE, AND POTASSIUM CHLORIDE 100 MILLILITER(S): 50; .745; 4.5 INJECTION, SOLUTION INTRAVENOUS at 06:58

## 2017-12-29 RX ADMIN — DEXTROSE MONOHYDRATE, SODIUM CHLORIDE, AND POTASSIUM CHLORIDE 100 MILLILITER(S): 50; .745; 4.5 INJECTION, SOLUTION INTRAVENOUS at 16:23

## 2017-12-29 RX ADMIN — Medication 40 MILLIGRAM(S): at 17:44

## 2017-12-29 RX ADMIN — Medication 1000 MILLIGRAM(S): at 18:00

## 2017-12-29 RX ADMIN — Medication 2: at 17:38

## 2017-12-29 NOTE — PROGRESS NOTE ADULT - ASSESSMENT
I/p:  87 yo female S/P ventral hernia repair, CARL, h/o afib on xarelto, rosario vasc #5      Hold Xarelto until ok to resume per surgery  Heparin 5000 units sq q 12h for VTE prophylaxis  monitor CBC, BMP daily  BLE venodynes  INC mobility as alena   will follow

## 2017-12-29 NOTE — PHYSICAL THERAPY INITIAL EVALUATION ADULT - GENERAL OBSERVATIONS, REHAB EVAL
O2 2L/min nc; NGT; IV; pope; flowtrons; HM; BP cuff; pulse oxym; pt rec'd in bed supine; pain level 5/10; RN Linda present / aware

## 2017-12-29 NOTE — PROGRESS NOTE ADULT - SUBJECTIVE AND OBJECTIVE BOX
REASON FOR VISIT: AF    HPI:  88 year old woman with a history of chronic AF (anticoagulated with Xarelto), HTN, HLD colon cancer admitted on 12/27 with acute small bowel obstruction requiring exploratory laparotomy / lysis of adhesions.    12/29/17:  Abdominal and throat discomfort.    MEDICATIONS  (STANDING):  dextrose 5% + sodium chloride 0.45% with potassium chloride 20 mEq/L 1000 milliLiter(s) (100 mL/Hr) IV Continuous <Continuous>  heparin  Injectable 5000 Unit(s) SubCutaneous every 12 hours  HYDROmorphone PCA (1 mG/mL) 30 milliLiter(s) PCA Continuous PCA Continuous  insulin lispro (HumaLOG) corrective regimen sliding scale   SubCutaneous every 6 hours  metoprolol    tartrate Injectable 5 milliGRAM(s) IV Push every 6 hours  pantoprazole  Injectable 40 milliGRAM(s) IV Push daily    MEDICATIONS  (PRN):  acetaminophen   Tablet 650 milliGRAM(s) Oral every 6 hours PRN For Temp greater than 38 C (100.4 F)  dextrose Gel 1 Dose(s) Oral once PRN Blood Glucose LESS THAN 70 milliGRAM(s)/deciliter  glucagon  Injectable 1 milliGRAM(s) IntraMuscular once PRN Glucose LESS THAN 70 milligrams/deciliter  HYDROmorphone PCA (1 mG/mL) Rescue Clinician Bolus 0.5 milliGRAM(s) IV Push every 15 minutes PRN for Pain Scale GREATER THAN 6  naloxone Injectable 0.1 milliGRAM(s) IV Push every 3 minutes PRN For ANY of the following changes in patient status:  A. RR LESS THAN 10 breaths per minute, B. Oxygen saturation LESS THAN 90%, C. Sedation score of 6  ondansetron Injectable 4 milliGRAM(s) IV Push every 6 hours PRN Nausea  ondansetron Injectable 4 milliGRAM(s) IV Push every 6 hours PRN Nausea     Vital Signs Last 24 Hrs  T(C): 36.7 (29 Dec 2017 08:53), Max: 36.7 (29 Dec 2017 08:53)  T(F): 98 (29 Dec 2017 08:53), Max: 98 (29 Dec 2017 08:53)  HR: 71 (29 Dec 2017 07:00) (62 - 89)  BP: 108/50 (29 Dec 2017 06:00) (81/30 - 121/36)  BP(mean): 64 (29 Dec 2017 06:00) (40 - 64)  RR: 18 (29 Dec 2017 07:00) (13 - 23)  SpO2: 99% (29 Dec 2017 07:00) (97% - 100%)    PHYSICAL EXAM:  Constitutional: Seated in bedside chair, no distress  HEENT:  No oral cyanosis. NGT in nostril  Pulmonary: Non-labored  Cardiovascular: Irregularly irregular, normal S2  Gastrointestinal: Abdomen is soft, incisional tenderness   Skin: No rash.  Psych:  Mood & affect appropriate    LABS:         CARDIAC MARKERS ( 27 Dec 2017 21:06 ) 0.055 ng/mL / x     / x     / x     / x                            11.9   9.3   )-----------( 172      ( 29 Dec 2017 06:36 )             37.3     137  |  105  |  32<H>  ----------------------------<  181<H>  4.1   |  26  |  0.96    Tele: AF    ECG: AF, low voltage QRS, nonspecific ST/T abnormalities

## 2017-12-29 NOTE — PHYSICAL THERAPY INITIAL EVALUATION ADULT - MODALITIES TREATMENT COMMENTS
pt left seated in chair post Eval; chair alarm donned; all above lines/monitors in place; callbell in reach; pt instructed not to get up alone; call nursing for assist; alena well; pain level 4/10; RN Linda made aware pt OOB

## 2017-12-29 NOTE — PROGRESS NOTE ADULT - SUBJECTIVE AND OBJECTIVE BOX
Stable s/p Hartmans procedure still with Ileus.  Events noted, pain controlled.    ICU Vital Signs Last 24 Hrs  T(C): 36.7 (29 Dec 2017 08:53), Max: 36.7 (29 Dec 2017 08:53)  T(F): 98 (29 Dec 2017 08:53), Max: 98 (29 Dec 2017 08:53)  HR: 70 (29 Dec 2017 16:16) (62 - 82)  BP: 105/69 (29 Dec 2017 15:00) (93/36 - 136/81)  BP(mean): 75 (29 Dec 2017 15:00) (49 - 91)  ABP: --  ABP(mean): --  RR: 20 (29 Dec 2017 16:00) (14 - 24)  SpO2: 96% (29 Dec 2017 16:00) (96% - 100%)      PE    Abd- soft, distended, wound clear, ostomy viable, FRANKLYN with minimal output so it was removed.      CARDIAC MARKERS ( 27 Dec 2017 21:06 )  0.055 ng/mL / x     / x     / x     / x                                11.9   9.3   )-----------( 172      ( 29 Dec 2017 06:36 )             37.3     CBC Full  -  ( 29 Dec 2017 06:36 )  WBC Count : 9.3 K/uL  Hemoglobin : 11.9 g/dL  Hematocrit : 37.3 %  Platelet Count - Automated : 172 K/uL  Mean Cell Volume : 89.9 fl  Mean Cell Hemoglobin : 28.7 pg  Mean Cell Hemoglobin Concentration : 31.9 gm/dL  Auto Neutrophil # : 6.8 K/uL  Auto Lymphocyte # : 1.3 K/uL  Auto Monocyte # : 1.1 K/uL  Auto Eosinophil # : 0.1 K/uL  Auto Basophil # : 0.1 K/uL  Auto Neutrophil % : 72.7 %  Auto Lymphocyte % : 13.6 %  Auto Monocyte % : 12.0 %  Auto Eosinophil % : 1.0 %  Auto Basophil % : 0.6 %        137  |  105  |  32<H>  ----------------------------<  181<H>  4.1   |  26  |  0.96    Ca    7.9<L>      29 Dec 2017 06:36  Mg     1.6         TPro  7.9  /  Alb  3.4  /  TBili  1.2  /  DBili  x   /  AST  19  /  ALT  16  /  AlkPhos  95  12-27    LIVER FUNCTIONS - ( 27 Dec 2017 21:06 )  Alb: 3.4 g/dL / Pro: 7.9 gm/dL / ALK PHOS: 95 U/L / ALT: 16 U/L / AST: 19 U/L / GGT: x           Urinalysis Basic - ( 27 Dec 2017 22:19 )    Color: Yellow / Appearance: Clear / S.025 / pH: x  Gluc: x / Ketone: Trace  / Bili: Negative / Urobili: 1 mg/dL   Blood: x / Protein: 100 mg/dL / Nitrite: Negative   Leuk Esterase: Trace / RBC: 6-10 /HPF / WBC 26-50   Sq Epi: x / Non Sq Epi: Occasional / Bacteria: Moderate      PT/INR - ( 27 Dec 2017 21:06 )   PT: 12.1 sec;   INR: 1.12 ratio         PTT - ( 27 Dec 2017 21:06 )  PTT:26.4 sec Post op CRAL hernia repair still with Ileus.  Events noted, pain controlled.    ICU Vital Signs Last 24 Hrs  T(C): 36.7 (29 Dec 2017 08:53), Max: 36.7 (29 Dec 2017 08:53)  T(F): 98 (29 Dec 2017 08:53), Max: 98 (29 Dec 2017 08:53)  HR: 70 (29 Dec 2017 16:16) (62 - 82)  BP: 105/69 (29 Dec 2017 15:00) (93/36 - 136/81)  BP(mean): 75 (29 Dec 2017 15:00) (49 - 91)  ABP: --  ABP(mean): --  RR: 20 (29 Dec 2017 16:00) (14 - 24)  SpO2: 96% (29 Dec 2017 16:00) (96% - 100%)      PE    Abd- soft, distended, wound clear, no BS      CARDIAC MARKERS ( 27 Dec 2017 21:06 )  0.055 ng/mL / x     / x     / x     / x                                11.9   9.3   )-----------( 172      ( 29 Dec 2017 06:36 )             37.3     CBC Full  -  ( 29 Dec 2017 06:36 )  WBC Count : 9.3 K/uL  Hemoglobin : 11.9 g/dL  Hematocrit : 37.3 %  Platelet Count - Automated : 172 K/uL  Mean Cell Volume : 89.9 fl  Mean Cell Hemoglobin : 28.7 pg  Mean Cell Hemoglobin Concentration : 31.9 gm/dL  Auto Neutrophil # : 6.8 K/uL  Auto Lymphocyte # : 1.3 K/uL  Auto Monocyte # : 1.1 K/uL  Auto Eosinophil # : 0.1 K/uL  Auto Basophil # : 0.1 K/uL  Auto Neutrophil % : 72.7 %  Auto Lymphocyte % : 13.6 %  Auto Monocyte % : 12.0 %  Auto Eosinophil % : 1.0 %  Auto Basophil % : 0.6 %        137  |  105  |  32<H>  ----------------------------<  181<H>  4.1   |  26  |  0.96    Ca    7.9<L>      29 Dec 2017 06:36  Mg     1.6     12-28    TPro  7.9  /  Alb  3.4  /  TBili  1.2  /  DBili  x   /  AST  19  /  ALT  16  /  AlkPhos  95  12-27    LIVER FUNCTIONS - ( 27 Dec 2017 21:06 )  Alb: 3.4 g/dL / Pro: 7.9 gm/dL / ALK PHOS: 95 U/L / ALT: 16 U/L / AST: 19 U/L / GGT: x           Urinalysis Basic - ( 27 Dec 2017 22:19 )    Color: Yellow / Appearance: Clear / S.025 / pH: x  Gluc: x / Ketone: Trace  / Bili: Negative / Urobili: 1 mg/dL   Blood: x / Protein: 100 mg/dL / Nitrite: Negative   Leuk Esterase: Trace / RBC: 6-10 /HPF / WBC 26-50   Sq Epi: x / Non Sq Epi: Occasional / Bacteria: Moderate      PT/INR - ( 27 Dec 2017 21:06 )   PT: 12.1 sec;   INR: 1.12 ratio         PTT - ( 27 Dec 2017 21:06 )  PTT:26.4 sec

## 2017-12-29 NOTE — PROGRESS NOTE ADULT - SUBJECTIVE AND OBJECTIVE BOX
Consult Note Adult-Hospitalist Attending [Charted Location: [facility] 062 01] [Authored: 28-Dec-2017 13:02]- for Visit: [id number], Complete, Revised, Signed in Full, General    Consult Note:   · Provider Specialty	Hospitalist	    Referral/Consultation:    Initial Consult:  · Requested by Name:	Dr. Lucas	  · Date/Time:	28-Dec-2017 13:02	  · Reason for Referral/Consultation:	medical management	      · Subjective and Objective: 	  PMD: Dr. Brittany Montelongo [phone]    CC: abd pain, N/V    HPI: 89 yo female with PMH of Paroxysmal A. fib, HTN, HLD, DM2, arthritis, GERD, colon CA presents to ED with complaint of abd pain, N/V since . Pt is visiting family here for the holidays. SHe has a history of colon cancer s/p resection. In ED CT scan of abdomen showed hig grade SBO within umbilical hernia. She underwent urgent surgery this morning for lysis of adhesion and repair of hernia with mesh with Dr. Lucas.     Currently pt resting comfortably. Feels soar around incision site. Has pope in place but decreased urine output (has only put out 25cc of dark urine for the past 1.5 hrs). Hypotensive. Feels tired. No chest pain or SOB.    PMH: as above  Home medications: actos 15mg daily, glipizide-metofromin 2.5-500mg daily, lantus 12 units qhs, enalapril 10mg daily, tramadol 50mg TID PRN, simvastatin 10mg 2x/week, lasix 40mg PRN, xarelto 20mg daily, prilosec and zantac PRN  PSH: colon resection, left hip replacement  FH: non contributory  SH: denies tobacco, etoh or drug use  Allergies: PCN    ROS: negative unless stated above in HPI    Vital Signs Last 24 Hrs  T(C): 36.3 (28 Dec 2017 09:30), Max: 38 (27 Dec 2017 21:09)  T(F): 97.4 (28 Dec 2017 09:30), Max: 100.4 (27 Dec 2017 21:09)  HR: 70 (28 Dec 2017 12:01) (66 - 111)  BP: 83/36 (28 Dec 2017 12:01) (83/36 - 185/95)  BP(mean): 48 (28 Dec 2017 12:01) (48 - 53)  RR: 15 (28 Dec 2017 12:01) (14 - 22)  SpO2: 98% (28 Dec 2017 12:01) (92% - 100%)    PHYSICAL EXAM:    Constitutional: NAD, well-groomed, well-developed  HEENT: PERRLA, EOMI, Normal Hearing  Neck: No LAD, No JVD  Back: Normal spine flexure, No CVA tenderness  Cardiovascular: S1 and S2, RRR  Respiratory: CTAB  Gastrointestinal: soft, tenderness around incision site, dressing C/D/I  Extremities: No peripheral edema  Vascular: 2+ peripheral pulses  Neurological: A/O x 3, no focal deficits  Psychiatric: Normal mood, normal affect  Skin: No rashes    Labs    CARDIAC MARKERS ( 27 Dec 2017 21:06 )  0.055 ng/mL / x     / x     / x     / x                                14.0   17.6  )-----------( 239      ( 28 Dec 2017 07:25 )             44.0     28 Dec 2017 07:25    134    |  99     |  24     ----------------------------<  204    3.6     |  27     |  0.92     Ca    8.4        28 Dec 2017 07:25    TPro  7.9    /  Alb  3.4    /  TBili  1.2    /  DBili  x      /  AST  19     /  ALT  16     /  AlkPhos  95     27 Dec 2017 21:06    PT/INR - ( 27 Dec 2017 21:06 )   PT: 12.1 sec;   INR: 1.12 ratio         PTT - ( 27 Dec 2017 21:06 )  PTT:26.4 sec  CAPILLARY BLOOD GLUCOSE      POCT Blood Glucose.: 163 mg/dL (28 Dec 2017 11:45)    LIVER FUNCTIONS - ( 27 Dec 2017 21:06 )  Alb: 3.4 g/dL / Pro: 7.9 gm/dL / ALK PHOS: 95 U/L / ALT: 16 U/L / AST: 19 U/L / GGT: x           Urinalysis Basic - ( 27 Dec 2017 22:19 )    Color: Yellow / Appearance: Clear / S.025 / pH: x  Gluc: x / Ketone: Trace  / Bili: Negative / Urobili: 1 mg/dL   Blood: x / Protein: 100 mg/dL / Nitrite: Negative   Leuk Esterase: Trace / RBC: 6-10 /HPF / WBC 26-50   Sq Epi: x / Non Sq Epi: Occasional / Bacteria: Moderate      Hemoglobin A1C, Whole Blood: 6.0 % ( @ 07:25)    < from: CT Abdomen and Pelvis w/ Oral Cont and w/ IV Cont (17 @ 00:35) >  EXAM:  CT ABDOMEN AND PELVIS OC IC                            PROCEDURE DATE:  2017          INTERPRETATION:  EXAM: CT ABDOMEN AND PELVIS WITH CONTRAST    CLINICAL INFORMATION:  Nodular and vomiting.  No oral intake for several   days.  Rule out bowel obstruction.    TECHNIQUE:   Axial CT images were acquired through the abdomen and pelvis.  Intravenous contrast:  95 cc of Omnipaque-350 mg/ml were administered,   and 5 cc were discarded.  Oral contrast:  Positive oral contrast was administered.  Coronal and sagittal reformats were generated.     COMPARISON STUDY:  None.    FINDINGS:  Visualized lower chest: Heart appears mildly enlarged.  Atherosclerotic   calcification of the visualized coronary arteries.    Liver: Unremarkable.  Bile ducts: Normal caliber.  Gallbladder: No evidence of gallstones or acute cholecystitis.  Spleen: Multiple punctate calcifications in the spleen compatible with  Pancreas: Markedly atrophic.  Adrenal glands: Unremarkable.   Kidneys/ureters: Kidneys enhance symmetrically without hydronephrosis or   renal stones.  Subcentimeter hypoattenuating left renal lesions are too   small to characterize by CT scan.      Pelvic organs are partially treated by streak artifact from left hip   prosthesis.  Bladder: Unremarkable.  Reproductive organs: No evidence of uterine or adnexal mass.    Bowel: There is a 1.5 cm linear calcific versus metallic foreign body   abutting the anterior (anti-dependent) wall of the stomach (2:28-30).    There is no associated wall gastric wall thickening with surrounding   inflammatory changes.  There is high-grade small bowel obstruction with   single transition point at the level of an umbilical hernia.  No   associated bowel wall thickening or pneumatosis.  Peritoneum: Mild abdominal and pelvic ascites.  No free air or abscess.    Retroperitoneum: No lymphadenopathy.  Vasculature: Atherosclerotic calcifications of the aortoiliac tree.    Abdominal wall/soft tissues: Approximately 9 x 7 sending umbilical hernia   witha 2.5 cm wide neck contains obstructed small bowel as well as   collapsed distal small bowel loops.  Bones: Marked thoracolumbar scoliosis and multilevel degenerative changes   in the spine.  Moderate to severe spinal canal stenosis at L2-L3, L3-L4   and L4-L5.  A left hip prosthesis is partially visualized.      IMPRESSION:  High-grade small bowel obstruction with single transition point at the   level of an umbilical hernia.  Surgical consultation is recommended.    Underlying incarceration and/or bowel ischemia should be excluded   clinically.  There is no bowel wall thickening, pneumatosis or   mesenteric/portal venous gas.  No evidence of gastrointestinal   perforation or abscess/drainable fluid collection.    Additionally there is a 1.5cm linear calcific versus metallic foreign   body abutting the anterior (anti-dependent ) wall the stomach without   evidence of gastric wall thickening or surrounding inflammatory changes.    The umbilical hernia measures approximately approximate 9x 7 cm with a   2.5 cm wide neck and contains obstructed small bowel as well as collapsed   distal small bowel loops.    < end of copied text >    MEDICATIONS  (STANDING):  ciprofloxacin   IVPB      ciprofloxacin   IVPB 400 milliGRAM(s) IV Intermittent every 12 hours  dextrose 5%. 1000 milliLiter(s) (50 mL/Hr) IV Continuous <Continuous>  dextrose 50% Injectable 12.5 Gram(s) IV Push once  dextrose 50% Injectable 25 Gram(s) IV Push once  dextrose 50% Injectable 25 Gram(s) IV Push once  heparin  Injectable 5000 Unit(s) SubCutaneous every 12 hours  HYDROmorphone PCA (1 mG/mL) 30 milliLiter(s) PCA Continuous PCA Continuous  insulin lispro (HumaLOG) corrective regimen sliding scale   SubCutaneous three times a day before meals  metoprolol    tartrate Injectable 5 milliGRAM(s) IV Push every 6 hours  metroNIDAZOLE  IVPB      metroNIDAZOLE  IVPB 500 milliGRAM(s) IV Intermittent every 8 hours  pantoprazole  Injectable 40 milliGRAM(s) IV Push daily  sodium chloride 0.9% Bolus 1000 milliLiter(s) IV Bolus once  sodium chloride 0.9%. 1000 milliLiter(s) (75 mL/Hr) IV Continuous <Continuous>    MEDICATIONS  (PRN):  acetaminophen   Tablet 650 milliGRAM(s) Oral every 6 hours PRN For Temp greater than 38 C (100.4 F)  dextrose Gel 1 Dose(s) Oral once PRN Blood Glucose LESS THAN 70 milliGRAM(s)/deciliter  glucagon  Injectable 1 milliGRAM(s) IntraMuscular once PRN Glucose LESS THAN 70 milligrams/deciliter  HYDROmorphone PCA (1 mG/mL) Rescue Clinician Bolus 0.5 milliGRAM(s) IV Push every 15 minutes PRN for Pain Scale GREATER THAN 6  naloxone Injectable 0.1 milliGRAM(s) IV Push every 3 minutes PRN For ANY of the following changes in patient status:  A. RR LESS THAN 10 breaths per minute, B. Oxygen saturation LESS THAN 90%, C. Sedation score of 6  ondansetron Injectable 4 milliGRAM(s) IV Push every 6 hours PRN Nausea  ondansetron Injectable 4 milliGRAM(s) IV Push every 6 hours PRN Nausea          Assessment and Recommendation:   · Assessment		  89 yo female with PMH of Paroxysmal A. fib, HTN, HLD, DM2, arthritis, GERD, colon CA presents to ED with complaint of abd pain, N/V since . Pt is visiting family here for the holidays. SHe has a history of colon cancer s/p resection. In ED CT scan of abdomen showed hig grade SBO within umbilical hernia. She underwent urgent surgery this morning for lysis of adhesion and repair of hernia with mesh with Dr. Lucas.     #SBO s/p lysis of adhesions and repair of umbilical hernia POD# 1  - management as per primary surgery team  - pain control - on dilaudid PCA currently  - NPO, advance as per per surgery  - incentive spirometry    #Post op hypotension: RESOLVED  - pope in place for strict I/Os  - s/p 4L IVFs.  output improved.  - continue maintenance fluids    #Leukocytosis - likely related to incarcerated hernia  - leukocytosis resolved.  - CXR negative  - abx as per surgery team - cipro and flagyl    #Paroxysmal A. fib  - not on rate control at home  - lopressor 5mg q6h until able to take PO as BP tolerates  - hold xarelto until ok with surgery. Anticoag services following.    #DM2  - HbA1c 6.0  - hold actos and glipizide/metformin  - ISS  - hold lantus until pt tolerating diet    #HTN  - hold enalapril until BP stabilized    #HLD  - check lipids  - restart statin when ok with PO    #GERD  - on protonix    #DVT prophylaxis  - as per surgery, on heparin sq    Attending Statement:  40 minutes spent on total encounter; more than 50% of the visit was spent counseling and/or coordinating care by the attending physician.

## 2017-12-29 NOTE — PROGRESS NOTE ADULT - SUBJECTIVE AND OBJECTIVE BOX
HPI:  this is a 89 yo female adm yesterday with cc of  worsening abd pain, nausea, vomiting since 12/24/17. Pt denied c/o fever, chills, chest pain, SOB, extremity pain or dysfunction, hemoptysis, hematemesis, hematuria, hematochexia, headache, diplopia, vertigo, dizzyness. (28 Dec 2017 03:48) found to have a incarcerated ventral hernia, now in ICU SD S/P repair and CARL, IN ER was given K centra for reversal of xarelto      Patient is a 88y old  Female who presents with a chief complaint of abd pain, nausea, vomitting since 12/24/17 (28 Dec 2017 03:48)      Consulted by Dr. Lucas  for VTE prophylaxis, risk stratification, and anticoagulation management.    PAST MEDICAL & SURGICAL HISTORY:  Hyperlipidemia, unspecified hyperlipidemia type  Chronic atrial fibrillation on Xarelto  Type 2 diabetes mellitus without complication, unspecified long term insulin use status  Hypertension, unspecified type  History of left hip replacement  History of colon resection    CrCl: 53.1    Caprini VTE Risk Score: #8    [id number] Score: #5    IMPROVE Bleeding Risk Score #3.5    Falls Risk:   High (x  )  Mod (  )  Low (  )  12-29-17 Pt seen at bedside on SD, oob in chair  NG tube noted.  Pt states she feels weak.  Discussed her xarelto on hold until she is better.  Informed her she is on heparin sq for now..      FAMILY HISTORY:  No pertinent family history in first degree relatives    Denies any personal or familial history of clotting or bleeding disorders.    Allergies    penicillin (Rash)    Intolerances        REVIEW OF SYSTEMS    (  )Fever	     (  )Constipation	(  )SOB				(  )Headache	(  )Dysuria  (  )Chills	     (  )Melena	(  )Dyspnea present on exertion	                    (  )Dizziness                    (  )Polyuria  (  )Nausea	     (  )Hematochezia	(  )Cough			                    (  )Syncope   	(  )Hematuria  (  )Vomiting    (  )Chest Pain	(  )Wheezing			(  )Weakness  (  )Diarrhea     (  )Palpitations	(  )Anorexia			(  )Myalgia    All other review of systems negative: Yes    PHYSICAL EXAM:    Constitutional: Appears Well    Neurological: A&O x3    Skin: Warm    Respiratory and Thorax: normal effort; Breath sounds: normal; No rales/wheezing/rhonchi  	  Cardiovascular: S1, S2, irregular, NMBR	MP AFib     Gastrointestinal: BS neg, dressing intact, NCT- LCWS draining brown drainage. + decreased bowal sounds noted    Genitourinary:  Bladder: pope in place    Musculoskeletal:   General Right:   no muscle/joint tenderness,   normal tone, no joint swelling,   ROM: full	    General Left:   no muscle/joint tenderness,   normal tone, no joint swelling,   ROM: full    Lower extrems:   Right: no calf tenderness              negative natty's sign               + pedal pulses    Left:   no calf tenderness              negative natty's sign               + pedal pulses                           11.9   9.3   )-----------( 172      ( 29 Dec 2017 06:36 )             37.3       12-29    137  |  105  |  32<H>  ----------------------------<  181<H>  4.1   |  26  |  0.96    Ca    7.9<L>      29 Dec 2017 06:36  Mg     1.6     12-28    TPro  7.9  /  Alb  3.4  /  TBili  1.2  /  DBili  x   /  AST  19  /  ALT  16  /  AlkPhos  95  12-27      PT/INR - ( 27 Dec 2017 21:06 )   PT: 12.1 sec;   INR: 1.12 ratio         PTT - ( 27 Dec 2017 21:06 )  PTT:26.4 sec                     14.0   17.6  )-----------( 239      ( 28 Dec 2017 07:25 )             44.0       12-28    134<L>  |  99  |  24<H>  ----------------------------<  204<H>  3.6   |  27  |  0.92    Ca    8.4<L>      28 Dec 2017 07:25    TPro  7.9  /  Alb  3.4  /  TBili  1.2  /  DBili  x   /  AST  19  /  ALT  16  /  AlkPhos  95  12-27      PT/INR - ( 27 Dec 2017 21:06 )   PT: 12.1 sec;   INR: 1.12 ratio         PTT - ( 27 Dec 2017 21:06 )  PTT:26.4 sec				    MEDICATIONS  (STANDING):  dextrose 5% + sodium chloride 0.45% with potassium chloride 20 mEq/L 1000 milliLiter(s) IV Continuous <Continuous>  dextrose 5%. 1000 milliLiter(s) IV Continuous <Continuous>  dextrose 50% Injectable 12.5 Gram(s) IV Push once  dextrose 50% Injectable 25 Gram(s) IV Push once  dextrose 50% Injectable 25 Gram(s) IV Push once  heparin  Injectable 5000 Unit(s) SubCutaneous every 12 hours  HYDROmorphone PCA (1 mG/mL) 30 milliLiter(s) PCA Continuous PCA Continuous  insulin lispro (HumaLOG) corrective regimen sliding scale   SubCutaneous every 6 hours  metoprolol    tartrate Injectable 5 milliGRAM(s) IV Push every 6 hours  pantoprazole  Injectable 40 milliGRAM(s) IV Push daily      DVT Prophylaxis:  LMWH                   (  )  Heparin SQ           ( x)  Coumadin             (  )  Xarelto                  (  )  Eliquis                   (  )  Venodynes           ( x )  Ambulation          (x  )  UFH                       (  )  Contraindicated  (  )

## 2017-12-30 LAB
ANION GAP SERPL CALC-SCNC: 6 MMOL/L — SIGNIFICANT CHANGE UP (ref 5–17)
BUN SERPL-MCNC: 22 MG/DL — SIGNIFICANT CHANGE UP (ref 7–23)
CALCIUM SERPL-MCNC: 8.4 MG/DL — LOW (ref 8.5–10.1)
CHLORIDE SERPL-SCNC: 105 MMOL/L — SIGNIFICANT CHANGE UP (ref 96–108)
CO2 SERPL-SCNC: 25 MMOL/L — SIGNIFICANT CHANGE UP (ref 22–31)
CREAT SERPL-MCNC: 0.73 MG/DL — SIGNIFICANT CHANGE UP (ref 0.5–1.3)
GLUCOSE BLDC GLUCOMTR-MCNC: 166 MG/DL — HIGH (ref 70–99)
GLUCOSE BLDC GLUCOMTR-MCNC: 219 MG/DL — HIGH (ref 70–99)
GLUCOSE BLDC GLUCOMTR-MCNC: 224 MG/DL — HIGH (ref 70–99)
GLUCOSE BLDC GLUCOMTR-MCNC: 225 MG/DL — HIGH (ref 70–99)
GLUCOSE SERPL-MCNC: 239 MG/DL — HIGH (ref 70–99)
MAGNESIUM SERPL-MCNC: 1.8 MG/DL — SIGNIFICANT CHANGE UP (ref 1.6–2.6)
PHOSPHATE SERPL-MCNC: 2.1 MG/DL — LOW (ref 2.5–4.5)
POTASSIUM SERPL-MCNC: 4.3 MMOL/L — SIGNIFICANT CHANGE UP (ref 3.5–5.3)
POTASSIUM SERPL-SCNC: 4.3 MMOL/L — SIGNIFICANT CHANGE UP (ref 3.5–5.3)
SODIUM SERPL-SCNC: 136 MMOL/L — SIGNIFICANT CHANGE UP (ref 135–145)

## 2017-12-30 PROCEDURE — 99233 SBSQ HOSP IP/OBS HIGH 50: CPT

## 2017-12-30 RX ORDER — ACETAMINOPHEN 500 MG
1000 TABLET ORAL ONCE
Qty: 0 | Refills: 0 | Status: COMPLETED | OUTPATIENT
Start: 2017-12-30 | End: 2017-12-30

## 2017-12-30 RX ORDER — ACETAMINOPHEN 500 MG
650 TABLET ORAL EVERY 6 HOURS
Qty: 0 | Refills: 0 | Status: DISCONTINUED | OUTPATIENT
Start: 2017-12-30 | End: 2017-12-30

## 2017-12-30 RX ORDER — GLUCAGON INJECTION, SOLUTION 0.5 MG/.1ML
1 INJECTION, SOLUTION SUBCUTANEOUS ONCE
Qty: 0 | Refills: 0 | Status: DISCONTINUED | OUTPATIENT
Start: 2017-12-30 | End: 2018-01-03

## 2017-12-30 RX ORDER — OXYCODONE HYDROCHLORIDE 5 MG/1
5 TABLET ORAL EVERY 4 HOURS
Qty: 0 | Refills: 0 | Status: DISCONTINUED | OUTPATIENT
Start: 2017-12-30 | End: 2018-01-03

## 2017-12-30 RX ORDER — INSULIN LISPRO 100/ML
VIAL (ML) SUBCUTANEOUS AT BEDTIME
Qty: 0 | Refills: 0 | Status: DISCONTINUED | OUTPATIENT
Start: 2017-12-30 | End: 2018-01-03

## 2017-12-30 RX ORDER — IPRATROPIUM/ALBUTEROL SULFATE 18-103MCG
3 AEROSOL WITH ADAPTER (GRAM) INHALATION EVERY 6 HOURS
Qty: 0 | Refills: 0 | Status: DISCONTINUED | OUTPATIENT
Start: 2017-12-30 | End: 2018-01-03

## 2017-12-30 RX ORDER — DEXTROSE 50 % IN WATER 50 %
12.5 SYRINGE (ML) INTRAVENOUS ONCE
Qty: 0 | Refills: 0 | Status: DISCONTINUED | OUTPATIENT
Start: 2017-12-30 | End: 2018-01-03

## 2017-12-30 RX ORDER — DEXTROSE 50 % IN WATER 50 %
25 SYRINGE (ML) INTRAVENOUS ONCE
Qty: 0 | Refills: 0 | Status: DISCONTINUED | OUTPATIENT
Start: 2017-12-30 | End: 2018-01-03

## 2017-12-30 RX ORDER — DEXTROSE 50 % IN WATER 50 %
1 SYRINGE (ML) INTRAVENOUS ONCE
Qty: 0 | Refills: 0 | Status: DISCONTINUED | OUTPATIENT
Start: 2017-12-30 | End: 2018-01-03

## 2017-12-30 RX ORDER — MAGNESIUM SULFATE 500 MG/ML
1 VIAL (ML) INJECTION ONCE
Qty: 0 | Refills: 0 | Status: COMPLETED | OUTPATIENT
Start: 2017-12-30 | End: 2017-12-30

## 2017-12-30 RX ORDER — INSULIN LISPRO 100/ML
VIAL (ML) SUBCUTANEOUS
Qty: 0 | Refills: 0 | Status: DISCONTINUED | OUTPATIENT
Start: 2017-12-30 | End: 2018-01-03

## 2017-12-30 RX ORDER — SODIUM CHLORIDE 9 MG/ML
1000 INJECTION, SOLUTION INTRAVENOUS
Qty: 0 | Refills: 0 | Status: DISCONTINUED | OUTPATIENT
Start: 2017-12-30 | End: 2018-01-03

## 2017-12-30 RX ORDER — SODIUM CHLORIDE 9 MG/ML
1000 INJECTION INTRAMUSCULAR; INTRAVENOUS; SUBCUTANEOUS ONCE
Qty: 0 | Refills: 0 | Status: COMPLETED | OUTPATIENT
Start: 2017-12-30 | End: 2017-12-30

## 2017-12-30 RX ORDER — DEXTROSE MONOHYDRATE, SODIUM CHLORIDE, AND POTASSIUM CHLORIDE 50; .745; 4.5 G/1000ML; G/1000ML; G/1000ML
1000 INJECTION, SOLUTION INTRAVENOUS
Qty: 0 | Refills: 0 | Status: DISCONTINUED | OUTPATIENT
Start: 2017-12-30 | End: 2018-01-03

## 2017-12-30 RX ADMIN — Medication 62.5 MILLIMOLE(S): at 17:22

## 2017-12-30 RX ADMIN — HEPARIN SODIUM 5000 UNIT(S): 5000 INJECTION INTRAVENOUS; SUBCUTANEOUS at 17:25

## 2017-12-30 RX ADMIN — BENZOCAINE AND MENTHOL 1 LOZENGE: 5; 1 LIQUID ORAL at 17:22

## 2017-12-30 RX ADMIN — Medication 5 MILLIGRAM(S): at 07:02

## 2017-12-30 RX ADMIN — SODIUM CHLORIDE 500 MILLILITER(S): 9 INJECTION INTRAMUSCULAR; INTRAVENOUS; SUBCUTANEOUS at 12:09

## 2017-12-30 RX ADMIN — Medication 100 GRAM(S): at 15:32

## 2017-12-30 RX ADMIN — DEXTROSE MONOHYDRATE, SODIUM CHLORIDE, AND POTASSIUM CHLORIDE 100 MILLILITER(S): 50; .745; 4.5 INJECTION, SOLUTION INTRAVENOUS at 02:24

## 2017-12-30 RX ADMIN — DEXTROSE MONOHYDRATE, SODIUM CHLORIDE, AND POTASSIUM CHLORIDE 70 MILLILITER(S): 50; .745; 4.5 INJECTION, SOLUTION INTRAVENOUS at 12:09

## 2017-12-30 RX ADMIN — Medication 400 MILLIGRAM(S): at 03:13

## 2017-12-30 RX ADMIN — Medication 3 MILLILITER(S): at 18:18

## 2017-12-30 RX ADMIN — PANTOPRAZOLE SODIUM 40 MILLIGRAM(S): 20 TABLET, DELAYED RELEASE ORAL at 12:14

## 2017-12-30 RX ADMIN — Medication 4: at 07:06

## 2017-12-30 RX ADMIN — Medication 1000 MILLIGRAM(S): at 03:10

## 2017-12-30 RX ADMIN — Medication 650 MILLIGRAM(S): at 21:39

## 2017-12-30 RX ADMIN — HEPARIN SODIUM 5000 UNIT(S): 5000 INJECTION INTRAVENOUS; SUBCUTANEOUS at 07:01

## 2017-12-30 RX ADMIN — Medication 4: at 00:30

## 2017-12-30 RX ADMIN — Medication 1: at 17:23

## 2017-12-30 NOTE — PROGRESS NOTE ADULT - SUBJECTIVE AND OBJECTIVE BOX
HPI:  this is a 87 yo female adm yesterday with cc of  worsening abd pain, nausea, vomiting since 12/24/17. Pt denied c/o fever, chills, chest pain, SOB, extremity pain or dysfunction, hemoptysis, hematemesis, hematuria, hematochexia, headache, diplopia, vertigo, dizzyness. (28 Dec 2017 03:48) found to have a incarcerated ventral hernia, now in ICU SD S/P repair and CARL, IN ER was given K centra for reversal of xarelto      Patient is a 88y old  Female who presents with a chief complaint of abd pain, nausea, vomitting since 12/24/17 (28 Dec 2017 03:48)      Consulted by Dr. Lucas  for VTE prophylaxis, risk stratification, and anticoagulation management.    PAST MEDICAL & SURGICAL HISTORY:  Hyperlipidemia, unspecified hyperlipidemia type  Chronic atrial fibrillation on Xarelto  Type 2 diabetes mellitus without complication, unspecified long term insulin use status  Hypertension, unspecified type  History of left hip replacement  History of colon resection    CrCl: 53.1    Caprini VTE Risk Score: #8    [id number] Score: #5    IMPROVE Bleeding Risk Score #3.5    Falls Risk:   High (x  )  Mod (  )  Low (  )  12-29-17 Pt seen at bedside on SD, oob in chair  NG tube noted.  Pt states she feels weak.  Discussed her xarelto on hold until she is better.  Informed her she is on heparin sq for now..   12/30/17; seen at bedside no changes     FAMILY HISTORY:  No pertinent family history in first degree relatives    Denies any personal or familial history of clotting or bleeding disorders.    Allergies    penicillin (Rash)    Intolerances        REVIEW OF SYSTEMS    (  )Fever	     (  )Constipation	(  )SOB				(  )Headache	(  )Dysuria  (  )Chills	     (  )Melena	(  )Dyspnea present on exertion	                    (  )Dizziness                    (  )Polyuria  (  )Nausea	     (  )Hematochezia	(  )Cough			                    (  )Syncope   	(  )Hematuria  (  )Vomiting    (  )Chest Pain	(  )Wheezing			(  )Weakness  (  )Diarrhea     (  )Palpitations	(  )Anorexia			(  )Myalgia    All other review of systems negative: Yes    PHYSICAL EXAM:    Constitutional: Appears Well    Neurological: A&O x3    Skin: Warm    Respiratory and Thorax: normal effort; Breath sounds: normal; No rales/wheezing/rhonchi  	  Cardiovascular: S1, S2, irregular, NMBR	MP AFib     Gastrointestinal: BS neg, dressing intact, NCT- LCWS draining brown drainage. + bowel sounds    Genitourinary:  Bladder: pope in place    Musculoskeletal:   General Right:   no muscle/joint tenderness,   normal tone, no joint swelling,   ROM: full	    General Left:   no muscle/joint tenderness,   normal tone, no joint swelling,   ROM: full    Lower extrems:   Right: no calf tenderness              negative natty's sign               + pedal pulses    Left:   no calf tenderness              negative natty's sign               + pedal pulses                 12-30    136  |  105  |  22  ----------------------------<  239<H>  4.3   |  25  |  0.73    Ca    8.4<L>      30 Dec 2017 06:05  Phos  2.1     12-30  Mg     1.8     12-30                                   11.9   9.3   )-----------( 172      ( 29 Dec 2017 06:36 )             37.3       12-29    137  |  105  |  32<H>  ----------------------------<  181<H>  4.1   |  26  |  0.96    Ca    7.9<L>      29 Dec 2017 06:36  Mg     1.6     12-28    TPro  7.9  /  Alb  3.4  /  TBili  1.2  /  DBili  x   /  AST  19  /  ALT  16  /  AlkPhos  95  12-27      PT/INR - ( 27 Dec 2017 21:06 )   PT: 12.1 sec;   INR: 1.12 ratio         PTT - ( 27 Dec 2017 21:06 )  PTT:26.4 sec                     14.0   17.6  )-----------( 239      ( 28 Dec 2017 07:25 )             44.0       12-28    134<L>  |  99  |  24<H>  ----------------------------<  204<H>  3.6   |  27  |  0.92    Ca    8.4<L>      28 Dec 2017 07:25    TPro  7.9  /  Alb  3.4  /  TBili  1.2  /  DBili  x   /  AST  19  /  ALT  16  /  AlkPhos  95  12-27      PT/INR - ( 27 Dec 2017 21:06 )   PT: 12.1 sec;   INR: 1.12 ratio         PTT - ( 27 Dec 2017 21:06 )  PTT:26.4 sec				    MEDICATIONS  (STANDING):  dextrose 5% + sodium chloride 0.45% with potassium chloride 20 mEq/L 1000 milliLiter(s) IV Continuous <Continuous>  dextrose 5%. 1000 milliLiter(s) IV Continuous <Continuous>  dextrose 50% Injectable 12.5 Gram(s) IV Push once  dextrose 50% Injectable 25 Gram(s) IV Push once  dextrose 50% Injectable 25 Gram(s) IV Push once  heparin  Injectable 5000 Unit(s) SubCutaneous every 12 hours  HYDROmorphone PCA (1 mG/mL) 30 milliLiter(s) PCA Continuous PCA Continuous  insulin lispro (HumaLOG) corrective regimen sliding scale   SubCutaneous every 6 hours  metoprolol    tartrate Injectable 5 milliGRAM(s) IV Push every 6 hours  pantoprazole  Injectable 40 milliGRAM(s) IV Push daily      DVT Prophylaxis:  LMWH                   (  )  Heparin SQ           ( x)  Coumadin             (  )  Xarelto                  (  )  Eliquis                   (  )  Venodynes           ( x )  Ambulation          (x  )  UFH                       (  )  Contraindicated  (  )

## 2017-12-30 NOTE — PROGRESS NOTE ADULT - SUBJECTIVE AND OBJECTIVE BOX
PCP:    REQUESTING PHYSICIAN:    REASON FOR CONSULT:    CHIEF COMPLAINT:    HPI:  88 year old woman with a history of chronic AF (anticoagulated with Xarelto), HTN, HLD colon cancer admitted on 12/27 with acute small bowel obstruction requiring exploratory laparotomy / lysis of adhesions.    12/29/17:  Abdominal and throat discomfort.  12/30/17: Complaining difficulty swallowing water. Pt denies chest pain. No arrhythmia    PAST MEDICAL & SURGICAL HISTORY:  Hyperlipidemia, unspecified hyperlipidemia type  Chronic atrial fibrillation  Type 2 diabetes mellitus without complication, unspecified long term insulin use status  Hypertension, unspecified type  History of left hip replacement  History of colon resection      SOCIAL HISTORY:    FAMILY HISTORY:  No pertinent family history in first degree relatives      ALLERGIES:  Allergies    penicillin (Rash)    Intolerances        MEDICATIONS:    MEDICATIONS  (STANDING):  dextrose 5% + sodium chloride 0.45% with potassium chloride 20 mEq/L 1000 milliLiter(s) (70 mL/Hr) IV Continuous <Continuous>  dextrose 5%. 1000 milliLiter(s) (50 mL/Hr) IV Continuous <Continuous>  dextrose 50% Injectable 12.5 Gram(s) IV Push once  dextrose 50% Injectable 25 Gram(s) IV Push once  dextrose 50% Injectable 25 Gram(s) IV Push once  heparin  Injectable 5000 Unit(s) SubCutaneous every 12 hours  insulin lispro (HumaLOG) corrective regimen sliding scale   SubCutaneous three times a day before meals  insulin lispro (HumaLOG) corrective regimen sliding scale   SubCutaneous at bedtime  magnesium sulfate  IVPB 1 Gram(s) IV Intermittent once  metoprolol    tartrate Injectable 5 milliGRAM(s) IV Push every 6 hours  pantoprazole  Injectable 40 milliGRAM(s) IV Push daily  sodium phosphate IVPB 15 milliMole(s) IV Intermittent once    MEDICATIONS  (PRN):  acetaminophen   Tablet 650 milliGRAM(s) Oral every 6 hours PRN For Temp greater than 38 C (100.4 F)  benzocaine 15 mG/menthol 3.6 mG Lozenge 1 Lozenge Oral every 6 hours PRN Sore Throat  dextrose Gel 1 Dose(s) Oral once PRN Blood Glucose LESS THAN 70 milliGRAM(s)/deciliter  glucagon  Injectable 1 milliGRAM(s) IntraMuscular once PRN Glucose LESS THAN 70 milligrams/deciliter  naloxone Injectable 0.1 milliGRAM(s) IV Push every 3 minutes PRN For ANY of the following changes in patient status:  A. RR LESS THAN 10 breaths per minute, B. Oxygen saturation LESS THAN 90%, C. Sedation score of 6  ondansetron Injectable 4 milliGRAM(s) IV Push every 6 hours PRN Nausea  ondansetron Injectable 4 milliGRAM(s) IV Push every 6 hours PRN Nausea  oxyCODONE    IR 5 milliGRAM(s) Oral every 4 hours PRN pain        Vital Signs Last 24 Hrs  T(C): 36.4 (30 Dec 2017 09:04), Max: 36.4 (30 Dec 2017 09:04)  T(F): 97.6 (30 Dec 2017 09:04), Max: 97.6 (30 Dec 2017 09:04)  HR: 65 (30 Dec 2017 07:00) (55 - 84)  BP: 126/39 (30 Dec 2017 07:00) (94/40 - 131/46)  BP(mean): 61 (30 Dec 2017 07:00) (52 - 75)  RR: 15 (30 Dec 2017 07:00) (15 - 23)  SpO2: 98% (30 Dec 2017 07:00) (96% - 99%)Daily     Daily I&O's Summary    29 Dec 2017 07:01  -  30 Dec 2017 07:00  --------------------------------------------------------  IN: 2550 mL / OUT: 900 mL / NET: 1650 mL        PHYSICAL EXAM:    Constitutional: NAD, awake and alert, well-developed  HEENT: PERR, EOMI,  No oral cyananosis.  Neck:  supple,  No JVD  Respiratory: Breath sounds are clear bilaterally, No wheezing, rales or rhonchi  Cardiovascular: S1 and S2, regular rate and rhythm, no Murmurs, gallops or rubs  Gastrointestinal: Bowel Sounds present, post op  Extremities: No peripheral edema. No clubbing or cyanosis.  Vascular: 2+ peripheral pulses  Neurological: A/O x 3, no focal deficits  Musculoskeletal: no calf tenderness.  Skin: No rashes.      LABS: All Labs Reviewed:                        11.9   9.3   )-----------( 172      ( 29 Dec 2017 06:36 )             37.3                         12.7   9.9   )-----------( 167      ( 28 Dec 2017 16:19 )             39.8                         14.0   17.6  )-----------( 239      ( 28 Dec 2017 07:25 )             44.0     30 Dec 2017 06:05    136    |  105    |  22     ----------------------------<  239    4.3     |  25     |  0.73   29 Dec 2017 06:36    137    |  105    |  32     ----------------------------<  181    4.1     |  26     |  0.96   28 Dec 2017 16:19    135    |  103    |  30     ----------------------------<  150    4.0     |  22     |  1.03     Ca    8.4        30 Dec 2017 06:05  Ca    7.9        29 Dec 2017 06:36  Ca    7.8        28 Dec 2017 16:19  Phos  2.1       30 Dec 2017 06:05  Mg     1.8       30 Dec 2017 06:05  Mg     1.6       28 Dec 2017 16:19    TPro  7.9    /  Alb  3.4    /  TBili  1.2    /  DBili  x      /  AST  19     /  ALT  16     /  AlkPhos  95     27 Dec 2017 21:06          Blood Culture: Organism --  Gram Stain Blood -- Gram Stain --  Specimen Source .Urine None  Culture-Blood --    Organism --  Gram Stain Blood -- Gram Stain --  Specimen Source .Blood Blood-Peripheral  Culture-Blood --            RADIOLOGY/EKG:      ECHO/CARDIAC CATHTERIZATION/STRESS TEST:

## 2017-12-30 NOTE — PROGRESS NOTE ADULT - SUBJECTIVE AND OBJECTIVE BOX
Doing well.  +BM, clears started.  NGT removed.    Vital Signs Last 24 Hrs  T(C): 36.4 (30 Dec 2017 09:04), Max: 36.4 (30 Dec 2017 09:04)  T(F): 97.6 (30 Dec 2017 09:04), Max: 97.6 (30 Dec 2017 09:04)  HR: 65 (30 Dec 2017 07:00) (55 - 84)  BP: 126/39 (30 Dec 2017 07:00) (94/40 - 136/46)  BP(mean): 61 (30 Dec 2017 07:00) (52 - 75)  RR: 15 (30 Dec 2017 07:00) (15 - 23)  SpO2: 98% (30 Dec 2017 07:00) (96% - 99%)                                11.9   9.3   )-----------( 172      ( 29 Dec 2017 06:36 )             37.3     CBC Full  -  ( 29 Dec 2017 06:36 )  WBC Count : 9.3 K/uL  Hemoglobin : 11.9 g/dL  Hematocrit : 37.3 %  Platelet Count - Automated : 172 K/uL  Mean Cell Volume : 89.9 fl  Mean Cell Hemoglobin : 28.7 pg  Mean Cell Hemoglobin Concentration : 31.9 gm/dL  Auto Neutrophil # : 6.8 K/uL  Auto Lymphocyte # : 1.3 K/uL  Auto Monocyte # : 1.1 K/uL  Auto Eosinophil # : 0.1 K/uL  Auto Basophil # : 0.1 K/uL  Auto Neutrophil % : 72.7 %  Auto Lymphocyte % : 13.6 %  Auto Monocyte % : 12.0 %  Auto Eosinophil % : 1.0 %  Auto Basophil % : 0.6 %    12-30    136  |  105  |  22  ----------------------------<  239<H>  4.3   |  25  |  0.73    Ca    8.4<L>      30 Dec 2017 06:05  Phos  2.1     12-30  Mg     1.8     12-30

## 2017-12-31 DIAGNOSIS — E83.39 OTHER DISORDERS OF PHOSPHORUS METABOLISM: ICD-10-CM

## 2017-12-31 DIAGNOSIS — C18.9 MALIGNANT NEOPLASM OF COLON, UNSPECIFIED: ICD-10-CM

## 2017-12-31 DIAGNOSIS — E78.5 HYPERLIPIDEMIA, UNSPECIFIED: ICD-10-CM

## 2017-12-31 DIAGNOSIS — E11.9 TYPE 2 DIABETES MELLITUS WITHOUT COMPLICATIONS: ICD-10-CM

## 2017-12-31 LAB
ANION GAP SERPL CALC-SCNC: 4 MMOL/L — LOW (ref 5–17)
BUN SERPL-MCNC: 15 MG/DL — SIGNIFICANT CHANGE UP (ref 7–23)
CALCIUM SERPL-MCNC: 8.4 MG/DL — LOW (ref 8.5–10.1)
CHLORIDE SERPL-SCNC: 107 MMOL/L — SIGNIFICANT CHANGE UP (ref 96–108)
CO2 SERPL-SCNC: 27 MMOL/L — SIGNIFICANT CHANGE UP (ref 22–31)
CREAT SERPL-MCNC: 0.66 MG/DL — SIGNIFICANT CHANGE UP (ref 0.5–1.3)
GLUCOSE BLDC GLUCOMTR-MCNC: 157 MG/DL — HIGH (ref 70–99)
GLUCOSE BLDC GLUCOMTR-MCNC: 176 MG/DL — HIGH (ref 70–99)
GLUCOSE BLDC GLUCOMTR-MCNC: 177 MG/DL — HIGH (ref 70–99)
GLUCOSE BLDC GLUCOMTR-MCNC: 204 MG/DL — HIGH (ref 70–99)
GLUCOSE SERPL-MCNC: 175 MG/DL — HIGH (ref 70–99)
HCT VFR BLD CALC: 33.5 % — LOW (ref 34.5–45)
HGB BLD-MCNC: 10.6 G/DL — LOW (ref 11.5–15.5)
MCHC RBC-ENTMCNC: 28.5 PG — SIGNIFICANT CHANGE UP (ref 27–34)
MCHC RBC-ENTMCNC: 31.5 GM/DL — LOW (ref 32–36)
MCV RBC AUTO: 90.4 FL — SIGNIFICANT CHANGE UP (ref 80–100)
PLATELET # BLD AUTO: 196 K/UL — SIGNIFICANT CHANGE UP (ref 150–400)
POTASSIUM SERPL-MCNC: 4.2 MMOL/L — SIGNIFICANT CHANGE UP (ref 3.5–5.3)
POTASSIUM SERPL-SCNC: 4.2 MMOL/L — SIGNIFICANT CHANGE UP (ref 3.5–5.3)
RBC # BLD: 3.7 M/UL — LOW (ref 3.8–5.2)
RBC # FLD: 14.9 % — HIGH (ref 10.3–14.5)
SODIUM SERPL-SCNC: 138 MMOL/L — SIGNIFICANT CHANGE UP (ref 135–145)
WBC # BLD: 7.4 K/UL — SIGNIFICANT CHANGE UP (ref 3.8–10.5)
WBC # FLD AUTO: 7.4 K/UL — SIGNIFICANT CHANGE UP (ref 3.8–10.5)

## 2017-12-31 RX ORDER — IBUPROFEN 200 MG
400 TABLET ORAL EVERY 6 HOURS
Qty: 0 | Refills: 0 | Status: DISCONTINUED | OUTPATIENT
Start: 2017-12-31 | End: 2018-01-02

## 2017-12-31 RX ADMIN — Medication 650 MILLIGRAM(S): at 13:44

## 2017-12-31 RX ADMIN — OXYCODONE HYDROCHLORIDE 5 MILLIGRAM(S): 5 TABLET ORAL at 00:43

## 2017-12-31 RX ADMIN — Medication 1: at 08:05

## 2017-12-31 RX ADMIN — OXYCODONE HYDROCHLORIDE 5 MILLIGRAM(S): 5 TABLET ORAL at 16:00

## 2017-12-31 RX ADMIN — Medication 2: at 17:48

## 2017-12-31 RX ADMIN — OXYCODONE HYDROCHLORIDE 5 MILLIGRAM(S): 5 TABLET ORAL at 01:15

## 2017-12-31 RX ADMIN — OXYCODONE HYDROCHLORIDE 5 MILLIGRAM(S): 5 TABLET ORAL at 14:54

## 2017-12-31 RX ADMIN — Medication 1: at 12:36

## 2017-12-31 RX ADMIN — Medication 5 MILLIGRAM(S): at 12:36

## 2017-12-31 RX ADMIN — OXYCODONE HYDROCHLORIDE 5 MILLIGRAM(S): 5 TABLET ORAL at 08:05

## 2017-12-31 RX ADMIN — DEXTROSE MONOHYDRATE, SODIUM CHLORIDE, AND POTASSIUM CHLORIDE 70 MILLILITER(S): 50; .745; 4.5 INJECTION, SOLUTION INTRAVENOUS at 05:17

## 2017-12-31 RX ADMIN — HEPARIN SODIUM 5000 UNIT(S): 5000 INJECTION INTRAVENOUS; SUBCUTANEOUS at 05:15

## 2017-12-31 RX ADMIN — ONDANSETRON 4 MILLIGRAM(S): 8 TABLET, FILM COATED ORAL at 00:43

## 2017-12-31 RX ADMIN — OXYCODONE HYDROCHLORIDE 5 MILLIGRAM(S): 5 TABLET ORAL at 09:00

## 2017-12-31 RX ADMIN — HEPARIN SODIUM 5000 UNIT(S): 5000 INJECTION INTRAVENOUS; SUBCUTANEOUS at 17:49

## 2017-12-31 RX ADMIN — PANTOPRAZOLE SODIUM 40 MILLIGRAM(S): 20 TABLET, DELAYED RELEASE ORAL at 12:35

## 2017-12-31 RX ADMIN — DEXTROSE MONOHYDRATE, SODIUM CHLORIDE, AND POTASSIUM CHLORIDE 70 MILLILITER(S): 50; .745; 4.5 INJECTION, SOLUTION INTRAVENOUS at 17:53

## 2017-12-31 NOTE — PROGRESS NOTE ADULT - ASSESSMENT
89 yo female with PMH of Paroxysmal A. fib, HTN, HLD, DM2, arthritis, GERD, colon CA presents to ED with complaint of abd pain, N/V since 12/24. Pt is visiting family here for the holidays. SHe has a history of colon cancer s/p resection. In ED CT scan of abdomen showed hig grade SBO within umbilical hernia. She underwent urgent surgery 12/27 for lysis of adhesion and repair of hernia with mesh with Dr. Lucas.    - tolerating ensure  - still has abdominal pain  - off abx  - advance diet as per surgery  - labs in am  - incentive spirometry  - DVT proph

## 2017-12-31 NOTE — PROGRESS NOTE ADULT - SUBJECTIVE AND OBJECTIVE BOX
Stable, +loose BMs, stable.  Tolerating diet- advanced.    Vital Signs Last 24 Hrs  T(C): 36.4 (31 Dec 2017 12:56), Max: 36.9 (31 Dec 2017 08:49)  T(F): 97.6 (31 Dec 2017 12:56), Max: 98.4 (31 Dec 2017 08:49)  HR: 61 (31 Dec 2017 18:00) (53 - 80)  BP: 156/35 (31 Dec 2017 18:00) (108/37 - 156/35)  BP(mean): 68 (31 Dec 2017 18:00) (55 - 84)  RR: 20 (31 Dec 2017 18:00) (14 - 25)  SpO2: 98% (31 Dec 2017 18:00) (92% - 99%)                              10.6   7.4   )-----------( 196      ( 31 Dec 2017 11:27 )             33.5     CBC Full  -  ( 31 Dec 2017 11:27 )  WBC Count : 7.4 K/uL  Hemoglobin : 10.6 g/dL  Hematocrit : 33.5 %  Platelet Count - Automated : 196 K/uL  Mean Cell Volume : 90.4 fl  Mean Cell Hemoglobin : 28.5 pg  Mean Cell Hemoglobin Concentration : 31.5 gm/dL  Auto Neutrophil # : x  Auto Lymphocyte # : x  Auto Monocyte # : x  Auto Eosinophil # : x  Auto Basophil # : x  Auto Neutrophil % : x  Auto Lymphocyte % : x  Auto Monocyte % : x  Auto Eosinophil % : x  Auto Basophil % : x    12-31    138  |  107  |  15  ----------------------------<  175<H>  4.2   |  27  |  0.66    Ca    8.4<L>      31 Dec 2017 11:27  Phos  2.1     12-30  Mg     1.8     12-30    Abd- soft, wound clear

## 2017-12-31 NOTE — PROGRESS NOTE ADULT - SUBJECTIVE AND OBJECTIVE BOX
Subjective:  still has abdominal pain  tolerating ensure    MEDICATIONS  (STANDING):  dextrose 5% + sodium chloride 0.45% with potassium chloride 20 mEq/L 1000 milliLiter(s) (70 mL/Hr) IV Continuous <Continuous>  dextrose 5%. 1000 milliLiter(s) (50 mL/Hr) IV Continuous <Continuous>  dextrose 50% Injectable 12.5 Gram(s) IV Push once  dextrose 50% Injectable 25 Gram(s) IV Push once  dextrose 50% Injectable 25 Gram(s) IV Push once  heparin  Injectable 5000 Unit(s) SubCutaneous every 12 hours  insulin lispro (HumaLOG) corrective regimen sliding scale   SubCutaneous three times a day before meals  insulin lispro (HumaLOG) corrective regimen sliding scale   SubCutaneous at bedtime  metoprolol    tartrate Injectable 5 milliGRAM(s) IV Push every 6 hours  pantoprazole  Injectable 40 milliGRAM(s) IV Push daily    MEDICATIONS  (PRN):  acetaminophen   Tablet 650 milliGRAM(s) Oral every 6 hours PRN For Temp greater than 38 C (100.4 F)  ALBUTerol/ipratropium for Nebulization 3 milliLiter(s) Nebulizer every 6 hours PRN Wheezing  benzocaine 15 mG/menthol 3.6 mG Lozenge 1 Lozenge Oral every 6 hours PRN Sore Throat  dextrose Gel 1 Dose(s) Oral once PRN Blood Glucose LESS THAN 70 milliGRAM(s)/deciliter  glucagon  Injectable 1 milliGRAM(s) IntraMuscular once PRN Glucose LESS THAN 70 milligrams/deciliter  naloxone Injectable 0.1 milliGRAM(s) IV Push every 3 minutes PRN For ANY of the following changes in patient status:  A. RR LESS THAN 10 breaths per minute, B. Oxygen saturation LESS THAN 90%, C. Sedation score of 6  ondansetron Injectable 4 milliGRAM(s) IV Push every 6 hours PRN Nausea  ondansetron Injectable 4 milliGRAM(s) IV Push every 6 hours PRN Nausea  oxyCODONE    IR 5 milliGRAM(s) Oral every 4 hours PRN pain      Allergies    penicillin (Rash)    Intolerances        REVIEW OF SYSTEMS:    CONSTITUTIONAL:  As per HPI.  HEENT:  Eyes:  No diplopia or blurred vision. ENT:  No earache, sore throat or runny nose.  CARDIOVASCULAR:  No pressure, squeezing, tightness, heaviness or aching about the chest, neck, axilla or epigastrium.  RESPIRATORY:  No cough, shortness of breath, PND or orthopnea.  GASTROINTESTINAL: abdominal pain  GENITOURINARY:  No dysuria, frequency or urgency.  MUSCULOSKELETAL:  no joint pain, deformity, tenderness  EXTREMITIES: no clubbing cyanosis,edema  SKIN:  No change in skin, hair or nails.  NEUROLOGIC:  No paresthesias, fasciculations, seizures or weakness.  PSYCHIATRIC:  No disorder of thought or mood.  ENDOCRINE:  No heat or cold intolerance, polyuria or polydipsia.  HEMATOLOGICAL:  No easy bruising or bleedings:    Vital Signs Last 24 Hrs  T(C): 36.9 (31 Dec 2017 08:49), Max: 37.2 (30 Dec 2017 17:07)  T(F): 98.4 (31 Dec 2017 08:49), Max: 99 (30 Dec 2017 17:07)  HR: 53 (31 Dec 2017 06:00) (53 - 80)  BP: 110/43 (31 Dec 2017 06:00) (95/58 - 146/61)  BP(mean): 60 (31 Dec 2017 06:00) (50 - 80)  RR: 16 (31 Dec 2017 06:00) (14 - 24)  SpO2: 98% (31 Dec 2017 06:00) (92% - 100%)    PHYSICAL EXAMINATION:  SKIN: no rashes  HEAD: NC/AT  EYES: PERRLA, EOMI  EARS: TM's intact  NOSE: no abnormalities  NECK:  Supple. No lymphadenopathy. Jugular venous pressure not elevated. Carotids equal.   HEART:   The cardiac impulse has a normal quality. Reg., Nl S1 and S2.    CHEST:  bilat ronchi w decreased BS bases  ABDOMEN:  positive tenderness.   EXTREMITIES:  no C/C/E  NEURO: AAO x 3, no focal deficts       LABS:    12-30    136  |  105  |  22  ----------------------------<  239<H>  4.3   |  25  |  0.73    Ca    8.4<L>      30 Dec 2017 06:05  Phos  2.1     12-30  Mg     1.8     12-30            RADIOLOGY & ADDITIONAL TESTS:

## 2018-01-01 LAB
ADD ON TEST-SPECIMEN IN LAB: SIGNIFICANT CHANGE UP
ALBUMIN SERPL ELPH-MCNC: 2.2 G/DL — LOW (ref 3.3–5)
ALP SERPL-CCNC: 70 U/L — SIGNIFICANT CHANGE UP (ref 40–120)
ALT FLD-CCNC: 13 U/L — SIGNIFICANT CHANGE UP (ref 12–78)
ANION GAP SERPL CALC-SCNC: 7 MMOL/L — SIGNIFICANT CHANGE UP (ref 5–17)
AST SERPL-CCNC: 11 U/L — LOW (ref 15–37)
BASOPHILS # BLD AUTO: 0.1 K/UL — SIGNIFICANT CHANGE UP (ref 0–0.2)
BASOPHILS NFR BLD AUTO: 1 % — SIGNIFICANT CHANGE UP (ref 0–2)
BILIRUB SERPL-MCNC: 0.4 MG/DL — SIGNIFICANT CHANGE UP (ref 0.2–1.2)
BUN SERPL-MCNC: 10 MG/DL — SIGNIFICANT CHANGE UP (ref 7–23)
CALCIUM SERPL-MCNC: 8.3 MG/DL — LOW (ref 8.5–10.1)
CHLORIDE SERPL-SCNC: 107 MMOL/L — SIGNIFICANT CHANGE UP (ref 96–108)
CO2 SERPL-SCNC: 24 MMOL/L — SIGNIFICANT CHANGE UP (ref 22–31)
CREAT SERPL-MCNC: 0.52 MG/DL — SIGNIFICANT CHANGE UP (ref 0.5–1.3)
EOSINOPHIL # BLD AUTO: 0.4 K/UL — SIGNIFICANT CHANGE UP (ref 0–0.5)
EOSINOPHIL NFR BLD AUTO: 6 % — SIGNIFICANT CHANGE UP (ref 0–6)
GLUCOSE BLDC GLUCOMTR-MCNC: 150 MG/DL — HIGH (ref 70–99)
GLUCOSE BLDC GLUCOMTR-MCNC: 171 MG/DL — HIGH (ref 70–99)
GLUCOSE BLDC GLUCOMTR-MCNC: 172 MG/DL — HIGH (ref 70–99)
GLUCOSE BLDC GLUCOMTR-MCNC: 191 MG/DL — HIGH (ref 70–99)
GLUCOSE SERPL-MCNC: 176 MG/DL — HIGH (ref 70–99)
HCT VFR BLD CALC: 32.1 % — LOW (ref 34.5–45)
HGB BLD-MCNC: 10.3 G/DL — LOW (ref 11.5–15.5)
LYMPHOCYTES # BLD AUTO: 0.9 K/UL — LOW (ref 1–3.3)
LYMPHOCYTES # BLD AUTO: 14.7 % — SIGNIFICANT CHANGE UP (ref 13–44)
MAGNESIUM SERPL-MCNC: 1.7 MG/DL — SIGNIFICANT CHANGE UP (ref 1.6–2.6)
MCHC RBC-ENTMCNC: 28.8 PG — SIGNIFICANT CHANGE UP (ref 27–34)
MCHC RBC-ENTMCNC: 32.1 GM/DL — SIGNIFICANT CHANGE UP (ref 32–36)
MCV RBC AUTO: 89.8 FL — SIGNIFICANT CHANGE UP (ref 80–100)
MONOCYTES # BLD AUTO: 0.8 K/UL — SIGNIFICANT CHANGE UP (ref 0–0.9)
MONOCYTES NFR BLD AUTO: 12.8 % — SIGNIFICANT CHANGE UP (ref 2–14)
NEUTROPHILS # BLD AUTO: 4 K/UL — SIGNIFICANT CHANGE UP (ref 1.8–7.4)
NEUTROPHILS NFR BLD AUTO: 65.5 % — SIGNIFICANT CHANGE UP (ref 43–77)
PHOSPHATE SERPL-MCNC: 0.9 MG/DL — CRITICAL LOW (ref 2.5–4.5)
PLATELET # BLD AUTO: 187 K/UL — SIGNIFICANT CHANGE UP (ref 150–400)
POTASSIUM SERPL-MCNC: 4.1 MMOL/L — SIGNIFICANT CHANGE UP (ref 3.5–5.3)
POTASSIUM SERPL-SCNC: 4.1 MMOL/L — SIGNIFICANT CHANGE UP (ref 3.5–5.3)
PROT SERPL-MCNC: 5.6 GM/DL — LOW (ref 6–8.3)
RBC # BLD: 3.57 M/UL — LOW (ref 3.8–5.2)
RBC # FLD: 14.5 % — SIGNIFICANT CHANGE UP (ref 10.3–14.5)
SODIUM SERPL-SCNC: 138 MMOL/L — SIGNIFICANT CHANGE UP (ref 135–145)
WBC # BLD: 6.1 K/UL — SIGNIFICANT CHANGE UP (ref 3.8–10.5)
WBC # FLD AUTO: 6.1 K/UL — SIGNIFICANT CHANGE UP (ref 3.8–10.5)

## 2018-01-01 PROCEDURE — 99232 SBSQ HOSP IP/OBS MODERATE 35: CPT

## 2018-01-01 PROCEDURE — 99233 SBSQ HOSP IP/OBS HIGH 50: CPT

## 2018-01-01 RX ORDER — RIVAROXABAN 15 MG-20MG
20 KIT ORAL EVERY 24 HOURS
Qty: 0 | Refills: 0 | Status: DISCONTINUED | OUTPATIENT
Start: 2018-01-01 | End: 2018-01-03

## 2018-01-01 RX ORDER — SIMVASTATIN 20 MG/1
10 TABLET, FILM COATED ORAL
Qty: 0 | Refills: 0 | Status: DISCONTINUED | OUTPATIENT
Start: 2018-01-01 | End: 2018-01-03

## 2018-01-01 RX ORDER — SODIUM,POTASSIUM PHOSPHATES 278-250MG
1 POWDER IN PACKET (EA) ORAL
Qty: 0 | Refills: 0 | Status: DISCONTINUED | OUTPATIENT
Start: 2018-01-01 | End: 2018-01-02

## 2018-01-01 RX ADMIN — HEPARIN SODIUM 5000 UNIT(S): 5000 INJECTION INTRAVENOUS; SUBCUTANEOUS at 06:07

## 2018-01-01 RX ADMIN — Medication 1: at 10:21

## 2018-01-01 RX ADMIN — OXYCODONE HYDROCHLORIDE 5 MILLIGRAM(S): 5 TABLET ORAL at 19:21

## 2018-01-01 RX ADMIN — PANTOPRAZOLE SODIUM 40 MILLIGRAM(S): 20 TABLET, DELAYED RELEASE ORAL at 11:59

## 2018-01-01 RX ADMIN — RIVAROXABAN 20 MILLIGRAM(S): KIT at 17:34

## 2018-01-01 RX ADMIN — OXYCODONE HYDROCHLORIDE 5 MILLIGRAM(S): 5 TABLET ORAL at 12:00

## 2018-01-01 RX ADMIN — Medication 5 MILLIGRAM(S): at 06:07

## 2018-01-01 RX ADMIN — Medication 1 TABLET(S): at 18:57

## 2018-01-01 RX ADMIN — Medication 1: at 11:58

## 2018-01-01 RX ADMIN — Medication 83.33 MILLIMOLE(S): at 18:57

## 2018-01-01 RX ADMIN — OXYCODONE HYDROCHLORIDE 5 MILLIGRAM(S): 5 TABLET ORAL at 06:18

## 2018-01-01 RX ADMIN — Medication 5 MILLIGRAM(S): at 11:59

## 2018-01-01 RX ADMIN — Medication 1 TABLET(S): at 21:24

## 2018-01-01 RX ADMIN — OXYCODONE HYDROCHLORIDE 5 MILLIGRAM(S): 5 TABLET ORAL at 23:51

## 2018-01-01 NOTE — PROGRESS NOTE ADULT - ASSESSMENT
I/p:  87 yo female S/P ventral hernia repair, CARL, h/o afib on xarelto, rosario vasc #5    plan:  D/W Dr Lucas: Ok to resume Xarelto  d/c heparin  start xarelto 20 mg po daily tonight at 1700  monitor CBC, BMP daily  BLE venodynes  INC mobility as alena   will follow

## 2018-01-01 NOTE — PROGRESS NOTE ADULT - SUBJECTIVE AND OBJECTIVE BOX
HPI:  this is a 89 yo female adm yesterday with cc of  worsening abd pain, nausea, vomiting since 12/24/17. Pt denied c/o fever, chills, chest pain, SOB, extremity pain or dysfunction, hemoptysis, hematemesis, hematuria, hematochexia, headache, diplopia, vertigo, dizzyness. (28 Dec 2017 03:48) found to have a incarcerated ventral hernia, now in ICU SD S/P repair and CARL, IN ER was given K centra for reversal of xarelto      Patient is a 88y old  Female who presents with a chief complaint of abd pain, nausea, vomitting since 12/24/17 (28 Dec 2017 03:48)      Consulted by Dr. Lucas  for VTE prophylaxis, risk stratification, and anticoagulation management.    PAST MEDICAL & SURGICAL HISTORY:  Hyperlipidemia, unspecified hyperlipidemia type  Chronic atrial fibrillation on Xarelto  Type 2 diabetes mellitus without complication, unspecified long term insulin use status  Hypertension, unspecified type  History of left hip replacement  History of colon resection    CrCl: 53.1    Caprini VTE Risk Score: #8    [id number] Score: #5    IMPROVE Bleeding Risk Score #3.5    Falls Risk:   High (x  )  Mod (  )  Low (  )  12-29-17 Pt seen at bedside on SD, oob in chair  NG tube noted.  Pt states she feels weak.  Discussed her xarelto on hold until she is better.  Informed her she is on heparin sq for now..   12/30/17; seen at bedside no changes   1/1/18: seen now on [facility] [place], [facility] [employer], no concerns      FAMILY HISTORY:  No pertinent family history in first degree relatives    Denies any personal or familial history of clotting or bleeding disorders.    Allergies    penicillin (Rash)    Intolerances        REVIEW OF SYSTEMS    (  )Fever	     (  )Constipation	(  )SOB				(  )Headache	(  )Dysuria  (  )Chills	     (  )Melena	(  )Dyspnea present on exertion	                    (  )Dizziness                    (  )Polyuria  (  )Nausea	     (  )Hematochezia	(  )Cough			                    (  )Syncope   	(  )Hematuria  (  )Vomiting    (  )Chest Pain	(  )Wheezing			(  )Weakness  (  )Diarrhea     (  )Palpitations	(  )Anorexia			(  )Myalgia    All other review of systems negative: Yes    PHYSICAL EXAM:    Constitutional: Appears Well    Neurological: A&O x3    Skin: Warm    Respiratory and Thorax: normal effort; Breath sounds: normal; No rales/wheezing/rhonchi  	  Cardiovascular: S1, S2, irregular, NMBR	MP AFib     Gastrointestinal: BS neg, dressing intact, . + bowel sounds, + stools,  alena full liquid diet    Genitourinary:  Bladder: pope in place    Musculoskeletal:   General Right:   no muscle/joint tenderness,   normal tone, no joint swelling,   ROM: full	    General Left:   no muscle/joint tenderness,   normal tone, no joint swelling,   ROM: full    Lower extrems:   Right: no calf tenderness              negative natty's sign               + pedal pulses    Left:   no calf tenderness              negative natty's sign               + pedal pulses                          10.3   6.1   )-----------( 187      ( 01 Jan 2018 06:12 )             32.1       01-01    138  |  107  |  10  ----------------------------<  176<H>  4.1   |  24  |  0.52    Ca    8.3<L>      01 Jan 2018 06:12    TPro  5.6<L>  /  Alb  2.2<L>  /  TBili  0.4  /  DBili  x   /  AST  11<L>  /  ALT  13  /  AlkPhos  70  01-01 12-30    136  |  105  |  22  ----------------------------<  239<H>  4.3   |  25  |  0.73    Ca    8.4<L>      30 Dec 2017 06:05  Phos  2.1     12-30  Mg     1.8     12-30                                   11.9   9.3   )-----------( 172      ( 29 Dec 2017 06:36 )             37.3       12-29    137  |  105  |  32<H>  ----------------------------<  181<H>  4.1   |  26  |  0.96    Ca    7.9<L>      29 Dec 2017 06:36  Mg     1.6     12-28    TPro  7.9  /  Alb  3.4  /  TBili  1.2  /  DBili  x   /  AST  19  /  ALT  16  /  AlkPhos  95  12-27      PT/INR - ( 27 Dec 2017 21:06 )   PT: 12.1 sec;   INR: 1.12 ratio         PTT - ( 27 Dec 2017 21:06 )  PTT:26.4 sec                     14.0   17.6  )-----------( 239      ( 28 Dec 2017 07:25 )             44.0     	  MEDICATIONS  (STANDING):  dextrose 5% + sodium chloride 0.45% with potassium chloride 20 mEq/L 1000 milliLiter(s) IV Continuous <Continuous>  dextrose 5%. 1000 milliLiter(s) IV Continuous <Continuous>  dextrose 50% Injectable 12.5 Gram(s) IV Push once  dextrose 50% Injectable 25 Gram(s) IV Push once  dextrose 50% Injectable 25 Gram(s) IV Push once  insulin lispro (HumaLOG) corrective regimen sliding scale   SubCutaneous three times a day before meals  insulin lispro (HumaLOG) corrective regimen sliding scale   SubCutaneous at bedtime  metoprolol    tartrate Injectable 5 milliGRAM(s) IV Push every 6 hours  pantoprazole  Injectable 40 milliGRAM(s) IV Push daily  rivaroxaban 20 milliGRAM(s) Oral every 24 hours        DVT Prophylaxis:  LMWH                   (  )  Heparin SQ           ()  Coumadin             (  )  Xarelto                  ( x )  Eliquis                   (  )  Venodynes           ( x )  Ambulation          (x  )  UFH                       (  )  Contraindicated  (  )

## 2018-01-01 NOTE — PROGRESS NOTE ADULT - SUBJECTIVE AND OBJECTIVE BOX
Consult Note Adult-Hospitalist Attending    Consult Note:   · Provider Specialty	Hospitalist	    Referral/Consultation:    Initial Consult:  · Requested by Name:	Dr. Lucas	  · Date/Time:	28-Dec-2017 13:02	  · Reason for Referral/Consultation:	medical management	      · Subjective and Objective: 	  PMD: Dr. Brittany Montelongo [phone]    CC: abd pain, N/V    HPI: 87 yo female with PMH of Paroxysmal A. fib, HTN, HLD, DM2, arthritis, GERD, colon CA presents to ED with complaint of abd pain, N/V since . Pt is visiting family here for the holidays. SHe has a history of colon cancer s/p resection. In ED CT scan of abdomen showed hig grade SBO within umbilical hernia. She underwent urgent surgery this morning for lysis of adhesion and repair of hernia with mesh with Dr. Lucas.   Currently pt resting comfortably. Feels soar around incision site. Has pope in place but decreased urine output (has only put out 25cc of dark urine for the past 1.5 hrs). Hypotensive. Feels tired. No chest pain or SOB.  Home medications: actos 15mg daily, glipizide-metofromin 2.5-500mg daily, lantus 12 units qhs, enalapril 10mg daily, tramadol 50mg TID PRN, simvastatin 10mg 2x/week, lasix 40mg PRN, xarelto 20mg daily, prilosec and zantac PRN  PSH: colon resection, left hip replacement    18:     ROS: negative unless stated above in HPI  Vital Signs Last 24 Hrs  T(C): 36.8 (2018 11:26), Max: 36.9 (2018 05:16)  T(F): 98.2 (2018 11:26), Max: 98.4 (2018 05:16)  HR: 63 (2018 11:26) (53 - 70)  BP: 117/77 (2018 11:26) (108/37 - 156/35)  BP(mean): 58 (31 Dec 2017 21:00) (55 - 76)  RR: 18 (2018 11:26) (16 - 25)  SpO2: 97% (2018 11:26) (92% - 99%)    PHYSICAL EXAM:    Constitutional: NAD, well-groomed, well-developed  HEENT: PERRLA, EOMI, Normal Hearing  Neck: No LAD, No JVD  Back: Normal spine flexure, No CVA tenderness  Cardiovascular: S1 and S2, RRR  Respiratory: CTAB  Gastrointestinal: soft, tenderness around incision site, dressing C/D/I  Extremities: No peripheral edema  Vascular: 2+ peripheral pulses  Neurological: A/O x 3, no focal deficits  Psychiatric: Normal mood, normal affect  Skin: No rashes      MEDICATIONS  (STANDING):  ciprofloxacin   IVPB      ciprofloxacin   IVPB 400 milliGRAM(s) IV Intermittent every 12 hours  dextrose 5%. 1000 milliLiter(s) (50 mL/Hr) IV Continuous <Continuous>  dextrose 50% Injectable 12.5 Gram(s) IV Push once  dextrose 50% Injectable 25 Gram(s) IV Push once  dextrose 50% Injectable 25 Gram(s) IV Push once  heparin  Injectable 5000 Unit(s) SubCutaneous every 12 hours  HYDROmorphone PCA (1 mG/mL) 30 milliLiter(s) PCA Continuous PCA Continuous  insulin lispro (HumaLOG) corrective regimen sliding scale   SubCutaneous three times a day before meals  metoprolol    tartrate Injectable 5 milliGRAM(s) IV Push every 6 hours  metroNIDAZOLE  IVPB      metroNIDAZOLE  IVPB 500 milliGRAM(s) IV Intermittent every 8 hours  pantoprazole  Injectable 40 milliGRAM(s) IV Push daily  sodium chloride 0.9% Bolus 1000 milliLiter(s) IV Bolus once  sodium chloride 0.9%. 1000 milliLiter(s) (75 mL/Hr) IV Continuous <Continuous>    MEDICATIONS  (PRN):  acetaminophen   Tablet 650 milliGRAM(s) Oral every 6 hours PRN For Temp greater than 38 C (100.4 F)  dextrose Gel 1 Dose(s) Oral once PRN Blood Glucose LESS THAN 70 milliGRAM(s)/deciliter  glucagon  Injectable 1 milliGRAM(s) IntraMuscular once PRN Glucose LESS THAN 70 milligrams/deciliter  HYDROmorphone PCA (1 mG/mL) Rescue Clinician Bolus 0.5 milliGRAM(s) IV Push every 15 minutes PRN for Pain Scale GREATER THAN 6  naloxone Injectable 0.1 milliGRAM(s) IV Push every 3 minutes PRN For ANY of the following changes in patient status:  A. RR LESS THAN 10 breaths per minute, B. Oxygen saturation LESS THAN 90%, C. Sedation score of 6  ondansetron Injectable 4 milliGRAM(s) IV Push every 6 hours PRN Nausea  ondansetron Injectable 4 milliGRAM(s) IV Push every 6 hours PRN Nausea      Lab Results:  CBC  CBC Full  -  ( 2018 06:12 )  WBC Count : 6.1 K/uL  Hemoglobin : 10.3 g/dL  Hematocrit : 32.1 %  Platelet Count - Automated : 187 K/uL  Mean Cell Volume : 89.8 fl  Mean Cell Hemoglobin : 28.8 pg  Mean Cell Hemoglobin Concentration : 32.1 gm/dL  Auto Neutrophil # : 4.0 K/uL  Auto Lymphocyte # : 0.9 K/uL  Auto Monocyte # : 0.8 K/uL  Auto Eosinophil # : 0.4 K/uL  Auto Basophil # : 0.1 K/uL  Auto Neutrophil % : 65.5 %  Auto Lymphocyte % : 14.7 %  Auto Monocyte % : 12.8 %  Auto Eosinophil % : 6.0 %  Auto Basophil % : 1.0 %    .		Differential:	[] Automated		[] Manual  Chemistry                        10.3   6.1   )-----------( 187      ( 2018 06:12 )             32.1         138  |  107  |  10  ----------------------------<  176<H>  4.1   |  24  |  0.52    Ca    8.3<L>      2018 06:12    TPro  5.6<L>  /  Alb  2.2<L>  /  TBili  0.4  /  DBili  x   /  AST  11<L>  /  ALT  13  /  AlkPhos  70      LIVER FUNCTIONS - ( 2018 06:12 )  Alb: 2.2 g/dL / Pro: 5.6 gm/dL / ALK PHOS: 70 U/L / ALT: 13 U/L / AST: 11 U/L / GGT: x             MICROBIOLOGY/CULTURES:  Culture Results:   10,000 - 49,000 CFU/mL Coag Negative Staphylococcus ( @ 22:19)  Culture Results:   No growth to date. ( @ 21:06)  Culture Results:   No growth to date. ( @ 21:06)    Urinalysis Basic - ( 27 Dec 2017 22:19 )    Color: Yellow / Appearance: Clear / S.025 / pH: x  Gluc: x / Ketone: Trace  / Bili: Negative / Urobili: 1 mg/dL   Blood: x / Protein: 100 mg/dL / Nitrite: Negative   Leuk Esterase: Trace / RBC: 6-10 /HPF / WBC 26-50   Sq Epi: x / Non Sq Epi: Occasional / Bacteria: Moderate      Hemoglobin A1C, Whole Blood: 6.0 % ( @ 07:25)    < from: CT Abdomen and Pelvis w/ Oral Cont and w/ IV Cont (17 @ 00:35) >  EXAM:  CT ABDOMEN AND PELVIS OC IC                            PROCEDURE DATE:  2017          INTERPRETATION:  EXAM: CT ABDOMEN AND PELVIS WITH CONTRAST    CLINICAL INFORMATION:  Nodular and vomiting.  No oral intake for several   days.  Rule out bowel obstruction.    TECHNIQUE:   Axial CT images were acquired through the abdomen and pelvis.  Intravenous contrast:  95 cc of Omnipaque-350 mg/ml were administered,   and 5 cc were discarded.  Oral contrast:  Positive oral contrast was administered.  Coronal and sagittal reformats were generated.     COMPARISON STUDY:  None.    FINDINGS:  Visualized lower chest: Heart appears mildly enlarged.  Atherosclerotic   calcification of the visualized coronary arteries.    Liver: Unremarkable.  Bile ducts: Normal caliber.  Gallbladder: No evidence of gallstones or acute cholecystitis.  Spleen: Multiple punctate calcifications in the spleen compatible with  Pancreas: Markedly atrophic.  Adrenal glands: Unremarkable.   Kidneys/ureters: Kidneys enhance symmetrically without hydronephrosis or   renal stones.  Subcentimeter hypoattenuating left renal lesions are too   small to characterize by CT scan.      Pelvic organs are partially treated by streak artifact from left hip   prosthesis.  Bladder: Unremarkable.  Reproductive organs: No evidence of uterine or adnexal mass.    Bowel: There is a 1.5 cm linear calcific versus metallic foreign body   abutting the anterior (anti-dependent) wall of the stomach (2:28-30).    There is no associated wall gastric wall thickening with surrounding   inflammatory changes.  There is high-grade small bowel obstruction with   single transition point at the level of an umbilical hernia.  No   associated bowel wall thickening or pneumatosis.  Peritoneum: Mild abdominal and pelvic ascites.  No free air or abscess.    Retroperitoneum: No lymphadenopathy.  Vasculature: Atherosclerotic calcifications of the aortoiliac tree.    Abdominal wall/soft tissues: Approximately 9 x 7 sending umbilical hernia   witha 2.5 cm wide neck contains obstructed small bowel as well as   collapsed distal small bowel loops.  Bones: Marked thoracolumbar scoliosis and multilevel degenerative changes   in the spine.  Moderate to severe spinal canal stenosis at L2-L3, L3-L4   and L4-L5.  A left hip prosthesis is partially visualized.      IMPRESSION:  High-grade small bowel obstruction with single transition point at the   level of an umbilical hernia.  Surgical consultation is recommended.    Underlying incarceration and/or bowel ischemia should be excluded   clinically.  There is no bowel wall thickening, pneumatosis or   mesenteric/portal venous gas.  No evidence of gastrointestinal   perforation or abscess/drainable fluid collection.    Additionally there is a 1.5cm linear calcific versus metallic foreign   body abutting the anterior (anti-dependent ) wall the stomach without   evidence of gastric wall thickening or surrounding inflammatory changes.    The umbilical hernia measures approximately approximate 9x 7 cm with a   2.5 cm wide neck and contains obstructed small bowel as well as collapsed   distal small bowel loops.    < end of copied text >    < from: Xray Chest 1 View AP/PA. (12.27.17 @ 21:58) >    IMPRESSION:    Clear lungs    < end of copied text > Consult Note Adult-Hospitalist Attending    Consult Note:   · Provider Specialty	Hospitalist	    Referral/Consultation:    Initial Consult:  · Requested by Name:	Dr. Lucas	  · Date/Time:	28-Dec-2017 13:02	  · Reason for Referral/Consultation:	medical management	      · Subjective and Objective: 	  PMD: Dr. Brittany Montelongo [phone]    CC: abd pain, N/V    HPI: 89 yo female with PMH of Paroxysmal A. fib, HTN, HLD, DM2, arthritis, GERD, colon CA presents to ED with complaint of abd pain, N/V since . Pt is visiting family here for the holidays. SHe has a history of colon cancer s/p resection. In ED CT scan of abdomen showed hig grade SBO within umbilical hernia. She underwent urgent surgery this morning for lysis of adhesion and repair of hernia with mesh with Dr. Lucas.   Currently pt resting comfortably. Feels soar around incision site. Has pope in place but decreased urine output (has only put out 25cc of dark urine for the past 1.5 hrs). Hypotensive. Feels tired. No chest pain or SOB.  Home medications: actos 15mg daily, glipizide-metofromin 2.5-500mg daily, lantus 12 units qhs, enalapril 10mg daily, tramadol 50mg TID PRN, simvastatin 10mg 2x/week, lasix 40mg PRN, xarelto 20mg daily, prilosec and zantac PRN  PSH: colon resection, left hip replacement    18: +flatus and Bowel movements; Tolerating diet; Occasional abd pain    ROS: negative unless stated above in HPI  Vital Signs Last 24 Hrs  T(C): 36.8 (2018 11:26), Max: 36.9 (2018 05:16)  T(F): 98.2 (2018 11:26), Max: 98.4 (2018 05:16)  HR: 63 (2018 11:26) (53 - 70)  BP: 117/77 (2018 11:26) (108/37 - 156/35)  BP(mean): 58 (31 Dec 2017 21:00) (55 - 71)  RR: 18 (2018 11:26) (16 - 25)  SpO2: 97% (2018 11:26) (92% - 99%)    PHYSICAL EXAM:    Constitutional: NAD, well-groomed, well-developed  HEENT: PERRLA, EOMI, Normal Hearing  Neck: No LAD, No JVD  Back: Normal spine flexure, No CVA tenderness  Cardiovascular: S1 and S2, RRR  Respiratory: CTAB  Gastrointestinal: soft, tenderness around incision site, dressing C/D/I  Extremities: No peripheral edema  Vascular: 2+ peripheral pulses  Neurological: A/O x 3, no focal deficits  Psychiatric: Normal mood, normal affect  Skin: No rashes      MEDICATIONS  (STANDING):  ciprofloxacin   IVPB      ciprofloxacin   IVPB 400 milliGRAM(s) IV Intermittent every 12 hours  dextrose 5%. 1000 milliLiter(s) (50 mL/Hr) IV Continuous <Continuous>  dextrose 50% Injectable 12.5 Gram(s) IV Push once  dextrose 50% Injectable 25 Gram(s) IV Push once  dextrose 50% Injectable 25 Gram(s) IV Push once  heparin  Injectable 5000 Unit(s) SubCutaneous every 12 hours  HYDROmorphone PCA (1 mG/mL) 30 milliLiter(s) PCA Continuous PCA Continuous  insulin lispro (HumaLOG) corrective regimen sliding scale   SubCutaneous three times a day before meals  metoprolol    tartrate Injectable 5 milliGRAM(s) IV Push every 6 hours  metroNIDAZOLE  IVPB      metroNIDAZOLE  IVPB 500 milliGRAM(s) IV Intermittent every 8 hours  pantoprazole  Injectable 40 milliGRAM(s) IV Push daily  sodium chloride 0.9% Bolus 1000 milliLiter(s) IV Bolus once  sodium chloride 0.9%. 1000 milliLiter(s) (75 mL/Hr) IV Continuous <Continuous>    MEDICATIONS  (PRN):  acetaminophen   Tablet 650 milliGRAM(s) Oral every 6 hours PRN For Temp greater than 38 C (100.4 F)  dextrose Gel 1 Dose(s) Oral once PRN Blood Glucose LESS THAN 70 milliGRAM(s)/deciliter  glucagon  Injectable 1 milliGRAM(s) IntraMuscular once PRN Glucose LESS THAN 70 milligrams/deciliter  HYDROmorphone PCA (1 mG/mL) Rescue Clinician Bolus 0.5 milliGRAM(s) IV Push every 15 minutes PRN for Pain Scale GREATER THAN 6  naloxone Injectable 0.1 milliGRAM(s) IV Push every 3 minutes PRN For ANY of the following changes in patient status:  A. RR LESS THAN 10 breaths per minute, B. Oxygen saturation LESS THAN 90%, C. Sedation score of 6  ondansetron Injectable 4 milliGRAM(s) IV Push every 6 hours PRN Nausea  ondansetron Injectable 4 milliGRAM(s) IV Push every 6 hours PRN Nausea      Lab Results:  CBC  CBC Full  -  ( 2018 06:12 )  WBC Count : 6.1 K/uL  Hemoglobin : 10.3 g/dL  Hematocrit : 32.1 %  Platelet Count - Automated : 187 K/uL  Mean Cell Volume : 89.8 fl  Mean Cell Hemoglobin : 28.8 pg  Mean Cell Hemoglobin Concentration : 32.1 gm/dL  Auto Neutrophil # : 4.0 K/uL  Auto Lymphocyte # : 0.9 K/uL  Auto Monocyte # : 0.8 K/uL  Auto Eosinophil # : 0.4 K/uL  Auto Basophil # : 0.1 K/uL  Auto Neutrophil % : 65.5 %  Auto Lymphocyte % : 14.7 %  Auto Monocyte % : 12.8 %  Auto Eosinophil % : 6.0 %  Auto Basophil % : 1.0 %    .		Differential:	[] Automated		[] Manual  Chemistry                        10.3   6.1   )-----------( 187      ( 2018 06:12 )             32.1         138  |  107  |  10  ----------------------------<  176<H>  4.1   |  24  |  0.52    Ca    8.3<L>      2018 06:12    TPro  5.6<L>  /  Alb  2.2<L>  /  TBili  0.4  /  DBili  x   /  AST  11<L>  /  ALT  13  /  AlkPhos  70      LIVER FUNCTIONS - ( 2018 06:12 )  Alb: 2.2 g/dL / Pro: 5.6 gm/dL / ALK PHOS: 70 U/L / ALT: 13 U/L / AST: 11 U/L / GGT: x             MICROBIOLOGY/CULTURES:  Culture Results:   10,000 - 49,000 CFU/mL Coag Negative Staphylococcus ( @ 22:19)  Culture Results:   No growth to date. ( @ 21:06)  Culture Results:   No growth to date. ( @ 21:06)    Urinalysis Basic - ( 27 Dec 2017 22:19 )    Color: Yellow / Appearance: Clear / S.025 / pH: x  Gluc: x / Ketone: Trace  / Bili: Negative / Urobili: 1 mg/dL   Blood: x / Protein: 100 mg/dL / Nitrite: Negative   Leuk Esterase: Trace / RBC: 6-10 /HPF / WBC 26-50   Sq Epi: x / Non Sq Epi: Occasional / Bacteria: Moderate      Hemoglobin A1C, Whole Blood: 6.0 % ( @ 07:25)    < from: CT Abdomen and Pelvis w/ Oral Cont and w/ IV Cont (17 @ 00:35) >  EXAM:  CT ABDOMEN AND PELVIS OC IC                            PROCEDURE DATE:  2017          INTERPRETATION:  EXAM: CT ABDOMEN AND PELVIS WITH CONTRAST    CLINICAL INFORMATION:  Nodular and vomiting.  No oral intake for several   days.  Rule out bowel obstruction.    TECHNIQUE:   Axial CT images were acquired through the abdomen and pelvis.  Intravenous contrast:  95 cc of Omnipaque-350 mg/ml were administered,   and 5 cc were discarded.  Oral contrast:  Positive oral contrast was administered.  Coronal and sagittal reformats were generated.     COMPARISON STUDY:  None.    FINDINGS:  Visualized lower chest: Heart appears mildly enlarged.  Atherosclerotic   calcification of the visualized coronary arteries.    Liver: Unremarkable.  Bile ducts: Normal caliber.  Gallbladder: No evidence of gallstones or acute cholecystitis.  Spleen: Multiple punctate calcifications in the spleen compatible with  Pancreas: Markedly atrophic.  Adrenal glands: Unremarkable.   Kidneys/ureters: Kidneys enhance symmetrically without hydronephrosis or   renal stones.  Subcentimeter hypoattenuating left renal lesions are too   small to characterize by CT scan.      Pelvic organs are partially treated by streak artifact from left hip   prosthesis.  Bladder: Unremarkable.  Reproductive organs: No evidence of uterine or adnexal mass.    Bowel: There is a 1.5 cm linear calcific versus metallic foreign body   abutting the anterior (anti-dependent) wall of the stomach (2:28-30).    There is no associated wall gastric wall thickening with surrounding   inflammatory changes.  There is high-grade small bowel obstruction with   single transition point at the level of an umbilical hernia.  No   associated bowel wall thickening or pneumatosis.  Peritoneum: Mild abdominal and pelvic ascites.  No free air or abscess.    Retroperitoneum: No lymphadenopathy.  Vasculature: Atherosclerotic calcifications of the aortoiliac tree.    Abdominal wall/soft tissues: Approximately 9 x 7 sending umbilical hernia   witha 2.5 cm wide neck contains obstructed small bowel as well as   collapsed distal small bowel loops.  Bones: Marked thoracolumbar scoliosis and multilevel degenerative changes   in the spine.  Moderate to severe spinal canal stenosis at L2-L3, L3-L4   and L4-L5.  A left hip prosthesis is partially visualized.      IMPRESSION:  High-grade small bowel obstruction with single transition point at the   level of an umbilical hernia.  Surgical consultation is recommended.    Underlying incarceration and/or bowel ischemia should be excluded   clinically.  There is no bowel wall thickening, pneumatosis or   mesenteric/portal venous gas.  No evidence of gastrointestinal   perforation or abscess/drainable fluid collection.    Additionally there is a 1.5cm linear calcific versus metallic foreign   body abutting the anterior (anti-dependent ) wall the stomach without   evidence of gastric wall thickening or surrounding inflammatory changes.    The umbilical hernia measures approximately approximate 9x 7 cm with a   2.5 cm wide neck and contains obstructed small bowel as well as collapsed   distal small bowel loops.    < end of copied text >    < from: Xray Chest 1 View AP/PA. (12.27.17 @ 21:58) >    IMPRESSION:    Clear lungs    < end of copied text >

## 2018-01-01 NOTE — PROGRESS NOTE ADULT - SUBJECTIVE AND OBJECTIVE BOX
CC:Patient is a 88y old  Female who presents with a chief complaint of abd pain, nausea, vomitting since 12/24/17 (28 Dec 2017 03:48)      Subjective:  Pt seen and examined at bedside with chaperone. Pt is AAOx3, pt in no acute distress. Pt stated tolerated incisional pain with meds. Pt denied c/o fever, chills, chest pain, SOB, N/V/D, extremity pain or dysfunction, hemoptysis, hematemesis, hematuria, hematochexia, headache, diplopia, vertigo, dizzyness. Pt tolerating diet, (+) void, (+) oob, (+) bowel function, incentive spirometry encouraged    ROS:  Abd incisional pain, otherwise negative ROS    Vital Signs Last 24 Hrs  T(C): 36.8 (01 Jan 2018 11:26), Max: 36.9 (01 Jan 2018 05:16)  T(F): 98.2 (01 Jan 2018 11:26), Max: 98.4 (01 Jan 2018 05:16)  HR: 63 (01 Jan 2018 11:26) (53 - 70)  BP: 117/77 (01 Jan 2018 11:26) (108/37 - 156/35)  BP(mean): 58 (31 Dec 2017 21:00) (55 - 71)  RR: 18 (01 Jan 2018 11:26) (16 - 25)  SpO2: 97% (01 Jan 2018 11:26) (92% - 99%)    Labs:                      10.3   6.1   )-----------( 187      ( 01 Jan 2018 06:12 )             32.1     CBC Full  -  ( 01 Jan 2018 06:12 )  WBC Count : 6.1 K/uL  Hemoglobin : 10.3 g/dL  Hematocrit : 32.1 %  Platelet Count - Automated : 187 K/uL  Mean Cell Volume : 89.8 fl  Mean Cell Hemoglobin : 28.8 pg  Mean Cell Hemoglobin Concentration : 32.1 gm/dL  Auto Neutrophil # : 4.0 K/uL  Auto Lymphocyte # : 0.9 K/uL  Auto Monocyte # : 0.8 K/uL  Auto Eosinophil # : 0.4 K/uL  Auto Basophil # : 0.1 K/uL  Auto Neutrophil % : 65.5 %  Auto Lymphocyte % : 14.7 %  Auto Monocyte % : 12.8 %  Auto Eosinophil % : 6.0 %  Auto Basophil % : 1.0 %    01-01    138  |  107  |  10  ----------------------------<  176<H>  4.1   |  24  |  0.52    Ca    8.3<L>      01 Jan 2018 06:12    TPro  5.6<L>  /  Alb  2.2<L>  /  TBili  0.4  /  DBili  x   /  AST  11<L>  /  ALT  13  /  AlkPhos  70  01-01    LIVER FUNCTIONS - ( 01 Jan 2018 06:12 )  Alb: 2.2 g/dL / Pro: 5.6 gm/dL / ALK PHOS: 70 U/L / ALT: 13 U/L / AST: 11 U/L / GGT: x                 Meds:  acetaminophen   Tablet 650 milliGRAM(s) Oral every 6 hours PRN  ALBUTerol/ipratropium for Nebulization 3 milliLiter(s) Nebulizer every 6 hours PRN  benzocaine 15 mG/menthol 3.6 mG Lozenge 1 Lozenge Oral every 6 hours PRN  dextrose 5% + sodium chloride 0.45% with potassium chloride 20 mEq/L 1000 milliLiter(s) IV Continuous <Continuous>  dextrose 5%. 1000 milliLiter(s) IV Continuous <Continuous>  dextrose 50% Injectable 12.5 Gram(s) IV Push once  dextrose 50% Injectable 25 Gram(s) IV Push once  dextrose 50% Injectable 25 Gram(s) IV Push once  dextrose Gel 1 Dose(s) Oral once PRN  glucagon  Injectable 1 milliGRAM(s) IntraMuscular once PRN  ibuprofen  Suspension 400 milliGRAM(s) Oral every 6 hours PRN  insulin lispro (HumaLOG) corrective regimen sliding scale   SubCutaneous three times a day before meals  insulin lispro (HumaLOG) corrective regimen sliding scale   SubCutaneous at bedtime  metoprolol    tartrate Injectable 5 milliGRAM(s) IV Push every 6 hours  naloxone Injectable 0.1 milliGRAM(s) IV Push every 3 minutes PRN  ondansetron Injectable 4 milliGRAM(s) IV Push every 6 hours PRN  ondansetron Injectable 4 milliGRAM(s) IV Push every 6 hours PRN  oxyCODONE    IR 5 milliGRAM(s) Oral every 4 hours PRN  pantoprazole  Injectable 40 milliGRAM(s) IV Push daily  rivaroxaban 20 milliGRAM(s) Oral every 24 hours      Radiology:      Physical exam:  Pt is aaox3  Pt in no acute distress  Resp: CTAB  CVS: S1S2(+)  ABD: bowel sounds (+), soft, non distended, no rebound, no guarding, no rigidity, no skin changes to exam. Incision sites are clean, dry, intact, no cellulitis, no d/c, no purulence, no fluctuance. (+) appropriate incisional tenderness to exam  EXT: no calf tenderness or edema to exam b/l, on VTE prophylaxis  Skin: no adverse skin changes to exam

## 2018-01-01 NOTE — PROGRESS NOTE ADULT - ASSESSMENT
A/P:  S/P repair of incarcerated incisional/ventral hernia repair with biologic mesh  GI/DVT prophylaxis  Pain control  Incentive spirometry  Serial abd exams  F/U labs  Pt stable from surgical standpoint  Hospitalist on consult for medical management  Anticoagulation service on consult, pt can resume Xarelto anticoagulation 1/1/18   for d/c planning  Physical therapy  Cont current care and meds  Pt aware of and agrees with all of the above

## 2018-01-01 NOTE — PROGRESS NOTE ADULT - ASSESSMENT
89 yo female with PMH of Paroxysmal A. fib, HTN, HLD, DM2, arthritis, GERD, colon CA presents to ED with complaint of abd pain, N/V since 12/24. Pt is visiting family here for the holidays. SHe has a history of colon cancer s/p resection. In ED CT scan of abdomen showed hig grade SBO within umbilical hernia. She underwent urgent surgery this morning for lysis of adhesion and repair of hernia with mesh with Dr. Lucas.     #SBO s/p lysis of adhesions and repair of umbilical hernia   - management as per primary surgery team  - pain control - on dilaudid PCA currently  - advance diet as per surgery  - incentive spirometry    #Post op hypotension: RESOLVED   - pope in place for strict I/Os  - s/p 4L IVFs.  output improved.  - continue maintenance fluids    #Leukocytosis - likely related to incarcerated hernia - Resolved  - leukocytosis resolved.  - CXR negative  - abx as per surgery team - cipro and flagyl    #Paroxysmal A. fib  - not on rate control at home  - lopressor 5mg q6h until able to take PO as BP tolerates  - hold xarelto until ok with surgery. Anticoag services following.     #DM2  - HbA1c 6.0  - hold actos and glipizide/metformin  - ISS  - hold lantus until pt tolerating diet    #HTN  - hold enalapril until BP stabilized    #HLD  - check lipids  - restart statin when ok with PO     #GERD  - on protonix    #DVT prophylaxis  - as per surgery, on heparin sq 87 yo female with PMH of Paroxysmal A. fib, HTN, HLD, DM2, arthritis, GERD, colon CA presents to ED with complaint of abd pain, N/V since 12/24. Pt is visiting family here for the holidays. SHe has a history of colon cancer s/p resection. In ED CT scan of abdomen showed hig grade SBO within umbilical hernia. She underwent urgent surgery this morning for lysis of adhesion and repair of hernia with mesh with Dr. Lucas.     #SBO s/p lysis of adhesions and repair of umbilical hernia   - management as per primary surgery team  - pain control   - advance diet as per surgery  - incentive spirometry    #Post op hypotension: RESOLVED   - pope in place for strict I/Os  - s/p 4L IVFs.  output improved.  - continue maintenance fluids    #Leukocytosis - likely related to incarcerated hernia - Resolved  - leukocytosis resolved.  - CXR negative    #Paroxysmal A. fib  - not on rate control at home  - resume xarelto as per surgery  - Anticoag services following.     #DM2  - HbA1c 6.0  - hold actos and glipizide/metformin  - ISS  - hold lantus 12Units QHS until pt tolerating diet well    #HTN  - resume enalapril with parameters    #HLD  - restart statin     #GERD  - on protonix    #DVT prophylaxis  - Xarelto

## 2018-01-01 NOTE — PROGRESS NOTE ADULT - SUBJECTIVE AND OBJECTIVE BOX
HPI:  88 year old woman with a history of chronic AF (anticoagulated with Xarelto), HTN, HLD colon cancer admitted on  with acute small bowel obstruction requiring exploratory laparotomy / lysis of adhesions.    17:  Abdominal and throat discomfort.  17: Complaining difficulty swallowing water. Pt denies chest pain. No arrhythmia  18:  No new complaints.  Denies chest pain or SOB.    PAST MEDICAL & SURGICAL HISTORY:  Hyperlipidemia, unspecified hyperlipidemia type  Chronic atrial fibrillation  Type 2 diabetes mellitus without complication, unspecified long term insulin use status  Hypertension, unspecified type  History of left hip replacement  History of colon resection    FAMILY HISTORY:  No pertinent family history in first degree relatives    ALLERGIES:  Allergies    penicillin (Rash)    Intolerances      MEDICATIONS  (STANDING):  dextrose 5% + sodium chloride 0.45% with potassium chloride 20 mEq/L 1000 milliLiter(s) (70 mL/Hr) IV Continuous <Continuous>  dextrose 5%. 1000 milliLiter(s) (50 mL/Hr) IV Continuous <Continuous>  dextrose 50% Injectable 12.5 Gram(s) IV Push once  dextrose 50% Injectable 25 Gram(s) IV Push once  dextrose 50% Injectable 25 Gram(s) IV Push once  enalapril 10 milliGRAM(s) Oral daily  insulin lispro (HumaLOG) corrective regimen sliding scale   SubCutaneous three times a day before meals  insulin lispro (HumaLOG) corrective regimen sliding scale   SubCutaneous at bedtime  pantoprazole  Injectable 40 milliGRAM(s) IV Push daily  rivaroxaban 20 milliGRAM(s) Oral every 24 hours  simvastatin 10 milliGRAM(s) Oral <User Schedule>    MEDICATIONS  (PRN):  acetaminophen   Tablet 650 milliGRAM(s) Oral every 6 hours PRN For Temp greater than 38 C (100.4 F)  ALBUTerol/ipratropium for Nebulization 3 milliLiter(s) Nebulizer every 6 hours PRN Wheezing  benzocaine 15 mG/menthol 3.6 mG Lozenge 1 Lozenge Oral every 6 hours PRN Sore Throat  dextrose Gel 1 Dose(s) Oral once PRN Blood Glucose LESS THAN 70 milliGRAM(s)/deciliter  glucagon  Injectable 1 milliGRAM(s) IntraMuscular once PRN Glucose LESS THAN 70 milligrams/deciliter  ibuprofen  Suspension 400 milliGRAM(s) Oral every 6 hours PRN pain fever  naloxone Injectable 0.1 milliGRAM(s) IV Push every 3 minutes PRN For ANY of the following changes in patient status:  A. RR LESS THAN 10 breaths per minute, B. Oxygen saturation LESS THAN 90%, C. Sedation score of 6  ondansetron Injectable 4 milliGRAM(s) IV Push every 6 hours PRN Nausea  ondansetron Injectable 4 milliGRAM(s) IV Push every 6 hours PRN Nausea  oxyCODONE    IR 5 milliGRAM(s) Oral every 4 hours PRN pain      Vital Signs Last 24 Hrs  T(C): 36.8 (2018 11:26), Max: 36.9 (2018 05:16)  T(F): 98.2 (2018 11:26), Max: 98.4 (2018 05:16)  HR: 63 (2018 11:26) (53 - 70)  BP: 117/77 (2018 11:26) (108/37 - 156/35)  BP(mean): 58 (31 Dec 2017 21:00) (55 - 71)  RR: 18 (2018 11:26) (16 - 25)  SpO2: 97% (2018 11:26) (92% - 99%)    I&O's Detail    31 Dec 2017 07:01  -  2018 07:00  --------------------------------------------------------  IN:    dextrose 5% + sodium chloride 0.45% with potassium chloride 20 mEq/L: 770 mL  Total IN: 770 mL    OUT:    Indwelling Catheter - Urethral: 325 mL    Voided: 300 mL  Total OUT: 625 mL    Total NET: 145 mL    Daily     Daily Weight in k.5 (31 Dec 2017 23:36)    PHYSICAL EXAM:    Constitutional: NAD, awake and alert, well-developed  HEENT: PERR, EOMI,  No oral cyananosis.  Neck:  supple,  No JVD  Respiratory: Breath sounds are clear bilaterally, No wheezing, rales or rhonchi  Cardiovascular: S1 and S2, ir, ir.    Gastrointestinal: Bowel Sounds present, post op  Extremities: No peripheral edema. No clubbing or cyanosis.  Vascular: 1+ peripheral pulses  Neurological: A/O x 3, no focal deficits  Musculoskeletal: no calf tenderness.  Skin: No rashes.    LABS: All Labs Reviewed:                      10.3   6.1   )-----------( 187      ( 2018 06:12 )             32.1         138  |  107  |  10  ----------------------------<  176<H>  4.1   |  24  |  0.52    Ca    8.3<L>      2018 06:12    TPro  5.6<L>  /  Alb  2.2<L>  /  TBili  0.4  /  DBili  x   /  AST  11<L>  /  ALT  13  /  AlkPhos  70      LIVER FUNCTIONS - ( 2018 06:12 )  Alb: 2.2 g/dL / Pro: 5.6 gm/dL / ALK PHOS: 70 U/L / ALT: 13 U/L / AST: 11 U/L / GGT: x

## 2018-01-02 LAB
ANION GAP SERPL CALC-SCNC: 7 MMOL/L — SIGNIFICANT CHANGE UP (ref 5–17)
BUN SERPL-MCNC: 8 MG/DL — SIGNIFICANT CHANGE UP (ref 7–23)
CALCIUM SERPL-MCNC: 8.2 MG/DL — LOW (ref 8.5–10.1)
CHLORIDE SERPL-SCNC: 106 MMOL/L — SIGNIFICANT CHANGE UP (ref 96–108)
CO2 SERPL-SCNC: 25 MMOL/L — SIGNIFICANT CHANGE UP (ref 22–31)
CREAT SERPL-MCNC: 0.49 MG/DL — LOW (ref 0.5–1.3)
CULTURE RESULTS: SIGNIFICANT CHANGE UP
CULTURE RESULTS: SIGNIFICANT CHANGE UP
GLUCOSE BLDC GLUCOMTR-MCNC: 151 MG/DL — HIGH (ref 70–99)
GLUCOSE BLDC GLUCOMTR-MCNC: 172 MG/DL — HIGH (ref 70–99)
GLUCOSE BLDC GLUCOMTR-MCNC: 178 MG/DL — HIGH (ref 70–99)
GLUCOSE BLDC GLUCOMTR-MCNC: 225 MG/DL — HIGH (ref 70–99)
GLUCOSE SERPL-MCNC: 167 MG/DL — HIGH (ref 70–99)
MAGNESIUM SERPL-MCNC: 1.7 MG/DL — SIGNIFICANT CHANGE UP (ref 1.6–2.6)
PHOSPHATE SERPL-MCNC: 2.8 MG/DL — SIGNIFICANT CHANGE UP (ref 2.5–4.5)
POTASSIUM SERPL-MCNC: 4.1 MMOL/L — SIGNIFICANT CHANGE UP (ref 3.5–5.3)
POTASSIUM SERPL-SCNC: 4.1 MMOL/L — SIGNIFICANT CHANGE UP (ref 3.5–5.3)
SODIUM SERPL-SCNC: 138 MMOL/L — SIGNIFICANT CHANGE UP (ref 135–145)
SPECIMEN SOURCE: SIGNIFICANT CHANGE UP
SPECIMEN SOURCE: SIGNIFICANT CHANGE UP

## 2018-01-02 PROCEDURE — 99233 SBSQ HOSP IP/OBS HIGH 50: CPT

## 2018-01-02 PROCEDURE — 99232 SBSQ HOSP IP/OBS MODERATE 35: CPT

## 2018-01-02 RX ADMIN — RIVAROXABAN 20 MILLIGRAM(S): KIT at 19:02

## 2018-01-02 RX ADMIN — Medication 650 MILLIGRAM(S): at 22:17

## 2018-01-02 RX ADMIN — PANTOPRAZOLE SODIUM 40 MILLIGRAM(S): 20 TABLET, DELAYED RELEASE ORAL at 12:24

## 2018-01-02 RX ADMIN — Medication 1 TABLET(S): at 08:04

## 2018-01-02 RX ADMIN — Medication 10 MILLIGRAM(S): at 05:29

## 2018-01-02 RX ADMIN — Medication 1: at 08:02

## 2018-01-02 RX ADMIN — Medication 1 TABLET(S): at 12:24

## 2018-01-02 RX ADMIN — Medication 1: at 19:01

## 2018-01-02 RX ADMIN — Medication 2: at 12:22

## 2018-01-02 RX ADMIN — Medication 650 MILLIGRAM(S): at 12:26

## 2018-01-02 NOTE — PROGRESS NOTE ADULT - ASSESSMENT
I/p:  89 yo female S/P ventral hernia repair, CARL, h/o afib on xarelto, rosario vasc #5    plan: 1-1-18 D/W Dr Lucas: Ok to resume Xarelto  : dc motrin   xarelto 20 mg po daily   monitor CBC, BMP daily  BLE venodynes  INC mobility as alena   will follow

## 2018-01-02 NOTE — PROGRESS NOTE ADULT - ASSESSMENT
A/P:  S/P repair of incarcerated incisional/ventral hernia repair with biologic mesh  GI/DVT prophylaxis  Pain control  Incentive spirometry  Serial abd exams  F/U labs  Pt stable from surgical standpoint  Hospitalist on consult for medical management  Anticoagulation service on consult, pt can resume Xarelto anticoagulation 1/1/18   for d/c planning  Physical therapy  Plastic surgery consult pending  Cont current care and meds  Pt aware of and agrees with all of the above

## 2018-01-02 NOTE — PROGRESS NOTE ADULT - SUBJECTIVE AND OBJECTIVE BOX
Consult Note Adult-Hospitalist Attending    Consult Note:   · Provider Specialty	Hospitalist	    Referral/Consultation:    Initial Consult:  · Requested by Name:	Dr. Lucas	  · Date/Time:	28-Dec-2017 13:02	  · Reason for Referral/Consultation:	medical management	      · Subjective and Objective: 	  PMD: Dr. Brittany Montelongo [phone]    CC: abd pain, N/V    HPI: 89 yo female with PMH of Paroxysmal A. fib, HTN, HLD, DM2, arthritis, GERD, colon CA presents to ED with complaint of abd pain, N/V since . Pt is visiting family here for the holidays. SHe has a history of colon cancer s/p resection. In ED CT scan of abdomen showed hig grade SBO within umbilical hernia. She underwent urgent surgery this morning for lysis of adhesion and repair of hernia with mesh with Dr. Lucas.   Currently pt resting comfortably. Feels soar around incision site. Has pope in place but decreased urine output (has only put out 25cc of dark urine for the past 1.5 hrs). Hypotensive. Feels tired. No chest pain or SOB.  Home medications: actos 15mg daily, glipizide-metofromin 2.5-500mg daily, lantus 12 units qhs, enalapril 10mg daily, tramadol 50mg TID PRN, simvastatin 10mg 2x/week, lasix 40mg PRN, xarelto 20mg daily, prilosec and zantac PRN  PSH: colon resection, left hip replacement    18: +flatus and Bowel movements; Tolerating diet; Occasional abd pain  18: yesterday patient tolerated diet and had low phosphorus levels; No other complaints.     ROS: negative unless stated above in HPI    Vital Signs Last 24 Hrs  T(C): 36.9 (2018 10:44), Max: 37 (2018 16:33)  T(F): 98.4 (2018 10:44), Max: 98.6 (2018 16:33)  HR: 53 (2018 10:44) (52 - 66)  BP: 126/42 (2018 10:44) (116/35 - 140/51)  BP(mean): --  RR: 17 (2018 10:44) (17 - 18)  SpO2: 97% (2018 10:44) (97% - 99%)    PHYSICAL EXAM:    Constitutional: NAD, well-groomed, well-developed  HEENT: PERRLA, EOMI, Normal Hearing  Neck: No LAD, No JVD  Back: Normal spine flexure, No CVA tenderness  Cardiovascular: S1 and S2, RRR  Respiratory: CTAB  Gastrointestinal: soft, tenderness around incision site, dressing C/D/I  Extremities: No peripheral edema  Vascular: 2+ peripheral pulses  Neurological: A/O x 3, no focal deficits  Psychiatric: Normal mood, normal affect  Skin: No rashes      MEDICATIONS  (STANDING):  ciprofloxacin   IVPB      ciprofloxacin   IVPB 400 milliGRAM(s) IV Intermittent every 12 hours  dextrose 5%. 1000 milliLiter(s) (50 mL/Hr) IV Continuous <Continuous>  dextrose 50% Injectable 12.5 Gram(s) IV Push once  dextrose 50% Injectable 25 Gram(s) IV Push once  dextrose 50% Injectable 25 Gram(s) IV Push once  heparin  Injectable 5000 Unit(s) SubCutaneous every 12 hours  HYDROmorphone PCA (1 mG/mL) 30 milliLiter(s) PCA Continuous PCA Continuous  insulin lispro (HumaLOG) corrective regimen sliding scale   SubCutaneous three times a day before meals  metoprolol    tartrate Injectable 5 milliGRAM(s) IV Push every 6 hours  metroNIDAZOLE  IVPB      metroNIDAZOLE  IVPB 500 milliGRAM(s) IV Intermittent every 8 hours  pantoprazole  Injectable 40 milliGRAM(s) IV Push daily  sodium chloride 0.9% Bolus 1000 milliLiter(s) IV Bolus once  sodium chloride 0.9%. 1000 milliLiter(s) (75 mL/Hr) IV Continuous <Continuous>    MEDICATIONS  (PRN):  acetaminophen   Tablet 650 milliGRAM(s) Oral every 6 hours PRN For Temp greater than 38 C (100.4 F)  dextrose Gel 1 Dose(s) Oral once PRN Blood Glucose LESS THAN 70 milliGRAM(s)/deciliter  glucagon  Injectable 1 milliGRAM(s) IntraMuscular once PRN Glucose LESS THAN 70 milligrams/deciliter  HYDROmorphone PCA (1 mG/mL) Rescue Clinician Bolus 0.5 milliGRAM(s) IV Push every 15 minutes PRN for Pain Scale GREATER THAN 6  naloxone Injectable 0.1 milliGRAM(s) IV Push every 3 minutes PRN For ANY of the following changes in patient status:  A. RR LESS THAN 10 breaths per minute, B. Oxygen saturation LESS THAN 90%, C. Sedation score of 6  ondansetron Injectable 4 milliGRAM(s) IV Push every 6 hours PRN Nausea  ondansetron Injectable 4 milliGRAM(s) IV Push every 6 hours PRN Nausea    Lab Results:  CBC  CBC Full  -  ( 2018 06:12 )  WBC Count : 6.1 K/uL  Hemoglobin : 10.3 g/dL  Hematocrit : 32.1 %  Platelet Count - Automated : 187 K/uL  Mean Cell Volume : 89.8 fl  Mean Cell Hemoglobin : 28.8 pg  Mean Cell Hemoglobin Concentration : 32.1 gm/dL  Auto Neutrophil # : 4.0 K/uL  Auto Lymphocyte # : 0.9 K/uL  Auto Monocyte # : 0.8 K/uL  Auto Eosinophil # : 0.4 K/uL  Auto Basophil # : 0.1 K/uL  Auto Neutrophil % : 65.5 %  Auto Lymphocyte % : 14.7 %  Auto Monocyte % : 12.8 %  Auto Eosinophil % : 6.0 %  Auto Basophil % : 1.0 %    .		Differential:	[] Automated		[] Manual  Chemistry                        10.3   6.1   )-----------( 187      ( 2018 06:12 )             32.1     -    138  |  106  |  8   ----------------------------<  167<H>  4.1   |  25  |  0.49<L>    Ca    8.2<L>      2018 06:40  Phos  2.8       Mg     1.7         TPro  5.6<L>  /  Alb  2.2<L>  /  TBili  0.4  /  DBili  x   /  AST  11<L>  /  ALT  13  /  AlkPhos  70      LIVER FUNCTIONS - ( 2018 06:12 )  Alb: 2.2 g/dL / Pro: 5.6 gm/dL / ALK PHOS: 70 U/L / ALT: 13 U/L / AST: 11 U/L / GGT: x           MICROBIOLOGY/CULTURES:  Culture Results:   10,000 - 49,000 CFU/mL Coag Negative Staphylococcus ( @ 22:19)  Culture Results:   No growth at 5 days. ( @ 21:06)  Culture Results:   No growth at 5 days. ( @ 21:06)      Urinalysis Basic - ( 27 Dec 2017 22:19 )    Color: Yellow / Appearance: Clear / S.025 / pH: x  Gluc: x / Ketone: Trace  / Bili: Negative / Urobili: 1 mg/dL   Blood: x / Protein: 100 mg/dL / Nitrite: Negative   Leuk Esterase: Trace / RBC: 6-10 /HPF / WBC 26-50   Sq Epi: x / Non Sq Epi: Occasional / Bacteria: Moderate      Hemoglobin A1C, Whole Blood: 6.0 % ( @ 07:25)    < from: CT Abdomen and Pelvis w/ Oral Cont and w/ IV Cont (17 @ 00:35) >  EXAM:  CT ABDOMEN AND PELVIS OC IC                            PROCEDURE DATE:  2017          INTERPRETATION:  EXAM: CT ABDOMEN AND PELVIS WITH CONTRAST    CLINICAL INFORMATION:  Nodular and vomiting.  No oral intake for several   days.  Rule out bowel obstruction.    TECHNIQUE:   Axial CT images were acquired through the abdomen and pelvis.  Intravenous contrast:  95 cc of Omnipaque-350 mg/ml were administered,   and 5 cc were discarded.  Oral contrast:  Positive oral contrast was administered.  Coronal and sagittal reformats were generated.     COMPARISON STUDY:  None.    FINDINGS:  Visualized lower chest: Heart appears mildly enlarged.  Atherosclerotic   calcification of the visualized coronary arteries.    Liver: Unremarkable.  Bile ducts: Normal caliber.  Gallbladder: No evidence of gallstones or acute cholecystitis.  Spleen: Multiple punctate calcifications in the spleen compatible with  Pancreas: Markedly atrophic.  Adrenal glands: Unremarkable.   Kidneys/ureters: Kidneys enhance symmetrically without hydronephrosis or   renal stones.  Subcentimeter hypoattenuating left renal lesions are too   small to characterize by CT scan.      Pelvic organs are partially treated by streak artifact from left hip   prosthesis.  Bladder: Unremarkable.  Reproductive organs: No evidence of uterine or adnexal mass.    Bowel: There is a 1.5 cm linear calcific versus metallic foreign body   abutting the anterior (anti-dependent) wall of the stomach (2:28-30).    There is no associated wall gastric wall thickening with surrounding   inflammatory changes.  There is high-grade small bowel obstruction with   single transition point at the level of an umbilical hernia.  No   associated bowel wall thickening or pneumatosis.  Peritoneum: Mild abdominal and pelvic ascites.  No free air or abscess.    Retroperitoneum: No lymphadenopathy.  Vasculature: Atherosclerotic calcifications of the aortoiliac tree.    Abdominal wall/soft tissues: Approximately 9 x 7 sending umbilical hernia   witha 2.5 cm wide neck contains obstructed small bowel as well as   collapsed distal small bowel loops.  Bones: Marked thoracolumbar scoliosis and multilevel degenerative changes   in the spine.  Moderate to severe spinal canal stenosis at L2-L3, L3-L4   and L4-L5.  A left hip prosthesis is partially visualized.      IMPRESSION:  High-grade small bowel obstruction with single transition point at the   level of an umbilical hernia.  Surgical consultation is recommended.    Underlying incarceration and/or bowel ischemia should be excluded   clinically.  There is no bowel wall thickening, pneumatosis or   mesenteric/portal venous gas.  No evidence of gastrointestinal   perforation or abscess/drainable fluid collection.    Additionally there is a 1.5cm linear calcific versus metallic foreign   body abutting the anterior (anti-dependent ) wall the stomach without   evidence of gastric wall thickening or surrounding inflammatory changes.    The umbilical hernia measures approximately approximate 9x 7 cm with a   2.5 cm wide neck and contains obstructed small bowel as well as collapsed   distal small bowel loops.    < end of copied text >    < from: Xray Chest 1 View AP/PA. (17 @ 21:58) >    IMPRESSION:    Clear lungs    < end of copied text >

## 2018-01-02 NOTE — PROGRESS NOTE ADULT - SUBJECTIVE AND OBJECTIVE BOX
CC:Patient is a 88y old  Female who presents with a chief complaint of abd pain, nausea, vomitting since 12/24/17 (28 Dec 2017 03:48)      Subjective:  Pt seen and examined at bedside with chaperone. Pt is AAOx3, pt in no acute distress. Pt stated tolerated incisional pain with meds. Pt denied c/o fever, chills, chest pain, SOB, N/V/D, extremity pain or dysfunction, hemoptysis, hematemesis, hematuria, hematochexia, headache, diplopia, vertigo, dizzyness. Pt tolerating diet, (+) void, (+) oob, (+) bowel function, incentive spirometry encouraged    ROS:  Abd incisional pain, otherwise negative ROS    Vital Signs Last 24 Hrs  T(C): 37.5 (02 Jan 2018 17:30), Max: 37.5 (02 Jan 2018 17:30)  T(F): 99.5 (02 Jan 2018 17:30), Max: 99.5 (02 Jan 2018 17:30)  HR: 63 (02 Jan 2018 17:30) (52 - 63)  BP: 154/63 (02 Jan 2018 17:30) (126/42 - 154/63)  BP(mean): --  RR: 16 (02 Jan 2018 17:30) (16 - 18)  SpO2: 98% (02 Jan 2018 17:30) (97% - 98%)    Labs:                       10.3   6.1   )-----------( 187      ( 01 Jan 2018 06:12 )             32.1     CBC Full  -  ( 01 Jan 2018 06:12 )  WBC Count : 6.1 K/uL  Hemoglobin : 10.3 g/dL  Hematocrit : 32.1 %  Platelet Count - Automated : 187 K/uL  Mean Cell Volume : 89.8 fl  Mean Cell Hemoglobin : 28.8 pg  Mean Cell Hemoglobin Concentration : 32.1 gm/dL  Auto Neutrophil # : 4.0 K/uL  Auto Lymphocyte # : 0.9 K/uL  Auto Monocyte # : 0.8 K/uL  Auto Eosinophil # : 0.4 K/uL  Auto Basophil # : 0.1 K/uL  Auto Neutrophil % : 65.5 %  Auto Lymphocyte % : 14.7 %  Auto Monocyte % : 12.8 %  Auto Eosinophil % : 6.0 %  Auto Basophil % : 1.0 %    01-02    138  |  106  |  8   ----------------------------<  167<H>  4.1   |  25  |  0.49<L>    Ca    8.2<L>      02 Jan 2018 06:40  Phos  2.8     01-02  Mg     1.7     01-02    TPro  5.6<L>  /  Alb  2.2<L>  /  TBili  0.4  /  DBili  x   /  AST  11<L>  /  ALT  13  /  AlkPhos  70  01-01    LIVER FUNCTIONS - ( 01 Jan 2018 06:12 )  Alb: 2.2 g/dL / Pro: 5.6 gm/dL / ALK PHOS: 70 U/L / ALT: 13 U/L / AST: 11 U/L / GGT: x                 Meds:  acetaminophen   Tablet 650 milliGRAM(s) Oral every 6 hours PRN  ALBUTerol/ipratropium for Nebulization 3 milliLiter(s) Nebulizer every 6 hours PRN  benzocaine 15 mG/menthol 3.6 mG Lozenge 1 Lozenge Oral every 6 hours PRN  dextrose 5% + sodium chloride 0.45% with potassium chloride 20 mEq/L 1000 milliLiter(s) IV Continuous <Continuous>  dextrose 5%. 1000 milliLiter(s) IV Continuous <Continuous>  dextrose 50% Injectable 12.5 Gram(s) IV Push once  dextrose 50% Injectable 25 Gram(s) IV Push once  dextrose 50% Injectable 25 Gram(s) IV Push once  dextrose Gel 1 Dose(s) Oral once PRN  enalapril 10 milliGRAM(s) Oral daily  glucagon  Injectable 1 milliGRAM(s) IntraMuscular once PRN  insulin lispro (HumaLOG) corrective regimen sliding scale   SubCutaneous three times a day before meals  insulin lispro (HumaLOG) corrective regimen sliding scale   SubCutaneous at bedtime  naloxone Injectable 0.1 milliGRAM(s) IV Push every 3 minutes PRN  ondansetron Injectable 4 milliGRAM(s) IV Push every 6 hours PRN  ondansetron Injectable 4 milliGRAM(s) IV Push every 6 hours PRN  oxyCODONE    IR 5 milliGRAM(s) Oral every 4 hours PRN  pantoprazole  Injectable 40 milliGRAM(s) IV Push daily  rivaroxaban 20 milliGRAM(s) Oral every 24 hours  simvastatin 10 milliGRAM(s) Oral <User Schedule>      Radiology:      Physical exam:  Pt is aaox3  Pt in no acute distress  Resp: CTAB  CVS: S1S2(+)  ABD: bowel sounds (+), soft, non distended, no rebound, no guarding, no rigidity, no skin changes to exam. Incision sites are clean, dry, intact, no cellulitis, no d/c, no purulence, no fluctuance. (+) appropriate incisional tenderness to exam  EXT: no calf tenderness or edema to exam b/l, on VTE prophylaxis  Skin: no adverse skin changes to exam

## 2018-01-02 NOTE — PROGRESS NOTE ADULT - SUBJECTIVE AND OBJECTIVE BOX
HPI:  88 year old woman with a history of chronic AF (anticoagulated with Xarelto), HTN, HLD colon cancer admitted on 12/27 with acute small bowel obstruction requiring exploratory laparotomy / lysis of adhesions.    12/29/17:  Abdominal and throat discomfort.  12/30/17: Complaining difficulty swallowing water. Pt denies chest pain. No arrhythmia  1/1/18:  No new complaints.  Denies chest pain or SOB.  1/2/18:  Comfortable; tolerating diet; no cardiac complaints.    MEDICATIONS  (STANDING):  enalapril 10 milliGRAM(s) Oral daily  insulin lispro (HumaLOG) corrective regimen sliding scale   SubCutaneous three times a day before meals  insulin lispro (HumaLOG) corrective regimen sliding scale   SubCutaneous at bedtime  pantoprazole  Injectable 40 milliGRAM(s) IV Push daily  potassium acid phosphate/sodium acid phosphate tablet (K-PHOS No. 2) 1 Tablet(s) Oral four times a day with meals  rivaroxaban 20 milliGRAM(s) Oral every 24 hours  simvastatin 10 milliGRAM(s) Oral <User Schedule>    MEDICATIONS  (PRN):  acetaminophen   Tablet 650 milliGRAM(s) Oral every 6 hours PRN For Temp greater than 38 C (100.4 F)  ALBUTerol/ipratropium for Nebulization 3 milliLiter(s) Nebulizer every 6 hours PRN Wheezing  benzocaine 15 mG/menthol 3.6 mG Lozenge 1 Lozenge Oral every 6 hours PRN Sore Throat  dextrose Gel 1 Dose(s) Oral once PRN Blood Glucose LESS THAN 70 milliGRAM(s)/deciliter  glucagon  Injectable 1 milliGRAM(s) IntraMuscular once PRN Glucose LESS THAN 70 milligrams/deciliter  ibuprofen  Suspension 400 milliGRAM(s) Oral every 6 hours PRN pain fever  naloxone Injectable 0.1 milliGRAM(s) IV Push every 3 minutes PRN For ANY of the following changes in patient status:  A. RR LESS THAN 10 breaths per minute, B. Oxygen saturation LESS THAN 90%, C. Sedation score of 6  ondansetron Injectable 4 milliGRAM(s) IV Push every 6 hours PRN Nausea  ondansetron Injectable 4 milliGRAM(s) IV Push every 6 hours PRN Nausea  oxyCODONE    IR 5 milliGRAM(s) Oral every 4 hours PRN pain    Vital Signs Last 24 Hrs  T(C): 36.9 (02 Jan 2018 05:04), Max: 37 (01 Jan 2018 16:33)  T(F): 98.4 (02 Jan 2018 05:04), Max: 98.6 (01 Jan 2018 16:33)  HR: 52 (02 Jan 2018 05:04) (52 - 66)  BP: 140/51 (02 Jan 2018 05:04) (116/35 - 140/51)  RR: 18 (02 Jan 2018 05:04) (18 - 18)  SpO2: 97% (02 Jan 2018 05:04) (97% - 99%)    PHYSICAL EXAM:  Constitutional: NAD, awake and alert, well-developed  Neck:  supple,  No JVD  Respiratory: Breath sounds are clear bilaterally, No wheezing, rales or rhonchi  Cardiovascular: Irregular  Extremities: No peripheral edema. No clubbing or cyanosis.  Neurological: A/O x 3, no focal deficits  Skin: No rash.    LABS:                    10.3   6.1   )-----------( 187      ( 01 Jan 2018 06:12 )             32.1     138  |  106  |  8   ----------------------------<  167<H>  4.1   |  25  |  0.49<L>    Tele: AF

## 2018-01-02 NOTE — PROGRESS NOTE ADULT - ASSESSMENT
87 yo female with PMH of Paroxysmal A. fib, HTN, HLD, DM2, arthritis, GERD, colon CA presents to ED with complaint of abd pain, N/V since 12/24. Pt is visiting family here for the holidays. SHe has a history of colon cancer s/p resection. In ED CT scan of abdomen showed hig grade SBO within umbilical hernia. She underwent urgent surgery this morning for lysis of adhesion and repair of hernia with mesh with Dr. Lucas.     #SBO s/p lysis of adhesions and repair of umbilical hernia   - management as per primary surgery team  - pain control   - advance diet as per surgery  - incentive spirometry    #Post op hypotension: RESOLVED   - pope in place for strict I/Os  - s/p 4L IVFs.  output improved.  - continue maintenance fluids    #Leukocytosis - likely related to incarcerated hernia - Resolved  - leukocytosis resolved.  - CXR negative    #Paroxysmal A. fib   - not on rate control at home  - continue Xarelto  - Anticoag services following.   - cardio consult appreciated     #Nocturnal bradycardia   -Asymptomatic - Can D/C Tele     #DM2  - HbA1c 6.0  - hold actos and glipizide/metformin  - ISS  - hold lantus 12Units QHS until pt tolerating diet well    #HTN  - resume enalapril with parameters    #HLD  - restart statin     #GERD  - on protonix    #DVT prophylaxis  - Xarelto

## 2018-01-02 NOTE — PROGRESS NOTE ADULT - SUBJECTIVE AND OBJECTIVE BOX
HPI:  this is a 87 yo female adm yesterday with cc of  worsening abd pain, nausea, vomiting since 12/24/17. Pt denied c/o fever, chills, chest pain, SOB, extremity pain or dysfunction, hemoptysis, hematemesis, hematuria, hematochexia, headache, diplopia, vertigo, dizzyness. (28 Dec 2017 03:48) found to have a incarcerated ventral hernia, now in ICU SD S/P repair and CARL, IN ER was given K centra for reversal of xarelto      Patient is a 88y old  Female who presents with a chief complaint of abd pain, nausea, vomitting since 12/24/17 (28 Dec 2017 03:48)  Consulted by Dr. Lucas  for VTE prophylaxis, risk stratification, and anticoagulation management.    PAST MEDICAL & SURGICAL HISTORY:  Hyperlipidemia, unspecified hyperlipidemia type  Chronic atrial fibrillation on Xarelto  Type 2 diabetes mellitus without complication, unspecified long term insulin use status  Hypertension, unspecified type  History of left hip replacement  History of colon resection    CrCl: 53.1    Caprini VTE Risk Score: #8    [id number] Score: #5    IMPROVE Bleeding Risk Score #3.5    Falls Risk:   High (x  )  Mod (  )  Low (  )  12-29-17 Pt seen at bedside on SD, oob in chair  NG tube noted.  Pt states she feels weak.  Discussed her xarelto on hold until she is better.  Informed her she is on heparin sq for now..   12/30/17; seen at bedside no changes   1/1/18: seen now on 3 east, [employer], no concerns  1-2-18: Pt seen at bedside with daughter in law present. discussed her anticoagulation with Xarelto No concerns.  Pt was concerned about taking Motrin with her Xarelto and she does not want it.  States Tylenol is fine.   states she will go to rehab tomorrow at [facility].      FAMILY HISTORY:  No pertinent family history in first degree relatives    Denies any personal or familial history of clotting or bleeding disorders.    Allergies    penicillin (Rash)    Intolerances        REVIEW OF SYSTEMS    (  )Fever	     (  )Constipation	(  )SOB				(  )Headache	(  )Dysuria  (  )Chills	     (  )Melena	(  )Dyspnea present on exertion	                    (  )Dizziness                    (  )Polyuria  (  )Nausea	     (  )Hematochezia	(  )Cough			                    (  )Syncope   	(  )Hematuria  (  )Vomiting    (  )Chest Pain	(  )Wheezing			(  )Weakness  (  )Diarrhea     (  )Palpitations	(  )Anorexia			(  )Myalgia    All other review of systems negative: Yes    PHYSICAL EXAM:    Constitutional: Appears Well    Neurological: A&O x3    Skin: Warm    Respiratory and Thorax: normal effort; Breath sounds: normal; No rales/wheezing/rhonchi  	  Cardiovascular: S1, S2, irregular, NMBR	MP AFib     Gastrointestinal: BS neg, dressing intact, . + bowel sounds hypo active, + stools,  aelna full liquid diet.  states she past gas today.    Genitourinary:  Bladder: non distended.    Musculoskeletal:   General Right:   no muscle/joint tenderness,   normal tone, no joint swelling,   ROM: full	    General Left:   no muscle/joint tenderness,   normal tone, no joint swelling,   ROM: full    Lower extrems:   Right: no calf tenderness              negative natty's sign               + pedal pulses    Left:   no calf tenderness              negative natty's sign               + pedal pulses                                     10.3   6.1   )-----------( 187      ( 01 Jan 2018 06:12 )             32.1       01-01    138  |  107  |  10  ----------------------------<  176<H>  4.1   |  24  |  0.52    Ca    8.3<L>      01 Jan 2018 06:12    TPro  5.6<L>  /  Alb  2.2<L>  /  TBili  0.4  /  DBili  x   /  AST  11<L>  /  ALT  13  /  AlkPhos  70  01-01 12-30    136  |  105  |  22  ----------------------------<  239<H>  4.3   |  25  |  0.73    Ca    8.4<L>      30 Dec 2017 06:05  Phos  2.1     12-30  Mg     1.8     12-30                                   11.9   9.3   )-----------( 172      ( 29 Dec 2017 06:36 )             37.3       12-29    137  |  105  |  32<H>  ----------------------------<  181<H>  4.1   |  26  |  0.96    Ca    7.9<L>      29 Dec 2017 06:36  Mg     1.6     12-28    TPro  7.9  /  Alb  3.4  /  TBili  1.2  /  DBili  x   /  AST  19  /  ALT  16  /  AlkPhos  95  12-27      PT/INR - ( 27 Dec 2017 21:06 )   PT: 12.1 sec;   INR: 1.12 ratio         PTT - ( 27 Dec 2017 21:06 )  PTT:26.4 sec                     14.0   17.6  )-----------( 239      ( 28 Dec 2017 07:25 )             44.0     	  MEDICATIONS  (STANDING):  dextrose 5% + sodium chloride 0.45% with potassium chloride 20 mEq/L 1000 milliLiter(s) IV Continuous <Continuous>  dextrose 5%. 1000 milliLiter(s) IV Continuous <Continuous>  dextrose 50% Injectable 12.5 Gram(s) IV Push once  dextrose 50% Injectable 25 Gram(s) IV Push once  dextrose 50% Injectable 25 Gram(s) IV Push once  enalapril 10 milliGRAM(s) Oral daily  insulin lispro (HumaLOG) corrective regimen sliding scale   SubCutaneous three times a day before meals  insulin lispro (HumaLOG) corrective regimen sliding scale   SubCutaneous at bedtime  pantoprazole  Injectable 40 milliGRAM(s) IV Push daily  potassium acid phosphate/sodium acid phosphate tablet (K-PHOS No. 2) 1 Tablet(s) Oral four times a day with meals  rivaroxaban 20 milliGRAM(s) Oral every 24 hours  simvastatin 10 milliGRAM(s) Oral <User Schedule>        DVT Prophylaxis:  LMWH                   (  )  Heparin SQ           ()  Coumadin             (  )  Xarelto                  ( x )  Eliquis                   (  )  Venodynes           ( x )  Ambulation          (x  )  UFH                       (  )  Contraindicated  (  )

## 2018-01-03 VITALS
TEMPERATURE: 98 F | SYSTOLIC BLOOD PRESSURE: 141 MMHG | OXYGEN SATURATION: 99 % | HEART RATE: 58 BPM | DIASTOLIC BLOOD PRESSURE: 56 MMHG

## 2018-01-03 LAB
GLUCOSE BLDC GLUCOMTR-MCNC: 138 MG/DL — HIGH (ref 70–99)
GLUCOSE BLDC GLUCOMTR-MCNC: 166 MG/DL — HIGH (ref 70–99)
GLUCOSE BLDC GLUCOMTR-MCNC: 180 MG/DL — HIGH (ref 70–99)

## 2018-01-03 PROCEDURE — 99233 SBSQ HOSP IP/OBS HIGH 50: CPT

## 2018-01-03 RX ORDER — INSULIN LISPRO 100/ML
0 VIAL (ML) SUBCUTANEOUS
Qty: 0 | Refills: 0 | COMMUNITY
Start: 2018-01-03

## 2018-01-03 RX ORDER — OMEPRAZOLE 10 MG/1
0 CAPSULE, DELAYED RELEASE ORAL
Qty: 0 | Refills: 0 | COMMUNITY

## 2018-01-03 RX ORDER — ALUMINUM ZIRCONIUM TRICHLOROHYDREX GLY 0.2 G/G
0 STICK TOPICAL
Qty: 0 | Refills: 0 | COMMUNITY

## 2018-01-03 RX ORDER — OMEPRAZOLE 10 MG/1
20 CAPSULE, DELAYED RELEASE ORAL
Qty: 280 | Refills: 0 | OUTPATIENT
Start: 2018-01-03 | End: 2018-01-16

## 2018-01-03 RX ORDER — FUROSEMIDE 40 MG
1 TABLET ORAL
Qty: 0 | Refills: 0 | COMMUNITY

## 2018-01-03 RX ORDER — RIVAROXABAN 15 MG-20MG
1 KIT ORAL
Qty: 0 | Refills: 0 | COMMUNITY
Start: 2018-01-03

## 2018-01-03 RX ORDER — IPRATROPIUM/ALBUTEROL SULFATE 18-103MCG
3 AEROSOL WITH ADAPTER (GRAM) INHALATION
Qty: 0 | Refills: 0 | COMMUNITY
Start: 2018-01-03

## 2018-01-03 RX ORDER — PANTOPRAZOLE SODIUM 20 MG/1
40 TABLET, DELAYED RELEASE ORAL
Qty: 0 | Refills: 0 | COMMUNITY
Start: 2018-01-03

## 2018-01-03 RX ORDER — ACETAMINOPHEN 500 MG
2 TABLET ORAL
Qty: 0 | Refills: 0 | COMMUNITY
Start: 2018-01-03

## 2018-01-03 RX ORDER — RIVAROXABAN 15 MG-20MG
1 KIT ORAL
Qty: 0 | Refills: 0 | COMMUNITY

## 2018-01-03 RX ORDER — OXYCODONE HYDROCHLORIDE 5 MG/1
1 TABLET ORAL
Qty: 0 | Refills: 0 | COMMUNITY
Start: 2018-01-03

## 2018-01-03 RX ADMIN — Medication 1: at 18:10

## 2018-01-03 RX ADMIN — PANTOPRAZOLE SODIUM 40 MILLIGRAM(S): 20 TABLET, DELAYED RELEASE ORAL at 12:10

## 2018-01-03 RX ADMIN — RIVAROXABAN 20 MILLIGRAM(S): KIT at 18:11

## 2018-01-03 RX ADMIN — Medication 1: at 12:10

## 2018-01-03 RX ADMIN — Medication 10 MILLIGRAM(S): at 08:41

## 2018-01-03 NOTE — PROGRESS NOTE ADULT - PROBLEM SELECTOR PLAN 1
FRANKLYN removed  local care  PT  antibiotics continued as per ID
Ventricular rate is controlled; resume Xarelto as cleared by surgery.
Ventricular rate is controlled; resume Xarelto when ok with surgical service - continue Heparin SQ in the interim.
Ventricular rate is controlled; resume Xarelto when ok with surgical service - continue Heparin SQ in the interim. Switch to po when tolerated. Metoprolol 25 mg bid.
stable post op  advance diet  placement
Nocturnal bradycardia (asymptomatic); ventricular rate is controlled; tolerating Xarelto.
Nocturnal bradycardia (asymptomatic); ventricular rate is controlled; tolerating Xarelto.
ileus resolved  clears started.  PT  transfer

## 2018-01-03 NOTE — PROGRESS NOTE ADULT - ASSESSMENT
· Assessment		  * Stable cardiac status; ok to d/c telemetry; will f/u as needed   hr 60/mt       Problem/Plan - 1:  ·  Problem: Chronic atrial fibrillation.  Plan: Nocturnal bradycardia (asymptomatic); ventricular rate is controlled; tolerating Xarelto.      Problem/Plan - 2:  ·  Problem: Hypertension, unspecified type.  Plan: Normotensive on enalapril.      Problem/Plan - 3:  ·  Problem: SBO (small bowel obstruction).  Plan: Management as per surgery.   to rehab

## 2018-01-03 NOTE — DISCHARGE NOTE ADULT - MEDICATION SUMMARY - MEDICATIONS TO TAKE
I will START or STAY ON the medications listed below when I get home from the hospital:    traMADol 50 mg oral tablet  -- orally 3 times a day, As Needed - for moderate pain  -- Indication: For SBO (small bowel obstruction)    acetaminophen 325 mg oral tablet  -- 2 tab(s) by mouth every 6 hours, As needed, For Temp greater than 38 C (100.4 F)  -- Indication: For SBO (small bowel obstruction)    oxyCODONE 5 mg oral tablet  -- 1 tab(s) by mouth every 4 hours, As needed, pain  -- Indication: For SBO (small bowel obstruction)    enalapril 10 mg oral tablet  -- 1 tab(s) by mouth once a day  -- Indication: For Hypertension, unspecified type    Actos 15 mg oral tablet  -- 1 tab(s) by mouth once a day  -- Indication: For Type 2 diabetes mellitus without complication, unspecified long term insulin use status    glipizide-metformin 2.5 mg-500 mg oral tablet  -- orally once a day  -- Indication: For Type 2 diabetes mellitus without complication, unspecified long term insulin use status    Lantus  -- 12 unit(s) subcutaneous once a day (at bedtime)  -- Indication: For Type 2 diabetes mellitus without complication, unspecified long term insulin use status    insulin lispro 100 units/mL subcutaneous solution  --  subcutaneous 3 times a day (before meals); 1 Unit(s) if Glucose 151 - 200  2 Unit(s) if Glucose 201 - 250  3 Unit(s) if Glucose 251 - 300  4 Unit(s) if Glucose 301 - 350  5 Unit(s) if Glucose 351 - 400  6 Unit(s) if Glucose Greater Than 400  -- Indication: For Type 2 diabetes mellitus without complication, unspecified long term insulin use status    insulin lispro 100 units/mL subcutaneous solution  --  subcutaneous once a day (at bedtime); 0 Unit(s) if Glucose 0 - 250  1 Unit(s) if Glucose 251 - 300  2 Unit(s) if Glucose 301 - 350  3 Unit(s) if Glucose 351 - 400  4 Unit(s) if Glucose Greater Than 400  -- Indication: For Type 2 diabetes mellitus without complication, unspecified long term insulin use status    simvastatin 10 mg oral tablet  -- orally 2 times a week  -- Indication: For Hyperlipidemia, unspecified hyperlipidemia type    ipratropium-albuterol 0.5 mg-2.5 mg/3 mLinhalation solution  -- 3 milliliter(s) inhaled every 6 hours, As needed, Wheezing  -- Indication: For CoPD    furosemide 20 mg oral tablet  -- 1 tab(s) by mouth once a day  -- Indication: For Hypertension, unspecified type    Lasix 20 mg oral tablet  -- 1 tab(s) by mouth once a day, As Needed water retention  -- Indication: For Hypertension, unspecified type    Zantac 150 oral tablet  -- 1 tab(s) by mouth 2 times a day  -- Indication: For GERD    pantoprazole 40 mg intravenous injection  -- 40 milligram(s) intravenous once a day  -- Indication: For GERD I will START or STAY ON the medications listed below when I get home from the hospital:    traMADol 50 mg oral tablet  -- orally 3 times a day, As Needed - for moderate pain  -- Indication: For SBO (small bowel obstruction)    acetaminophen 325 mg oral tablet  -- 2 tab(s) by mouth every 6 hours, As needed, For Temp greater than 38 C (100.4 F)  -- Indication: For SBO (small bowel obstruction)    oxyCODONE 5 mg oral tablet  -- 1 tab(s) by mouth every 4 hours, As needed, pain  -- Indication: For SBO (small bowel obstruction)    enalapril 10 mg oral tablet  -- 1 tab(s) by mouth once a day  -- Indication: For Hypertension, unspecified type    rivaroxaban 20 mg oral tablet  -- 1 tab(s) by mouth every 24 hours  -- Indication: For Chronic atrial fibrillation    Actos 15 mg oral tablet  -- 1 tab(s) by mouth once a day  -- Indication: For Type 2 diabetes mellitus without complication, unspecified long term insulin use status    glipizide-metformin 2.5 mg-500 mg oral tablet  -- orally once a day  -- Indication: For Type 2 diabetes mellitus without complication, unspecified long term insulin use status    Lantus  -- 12 unit(s) subcutaneous once a day (at bedtime)  -- Indication: For Type 2 diabetes mellitus without complication, unspecified long term insulin use status    simvastatin 10 mg oral tablet  -- orally 2 times a week  -- Indication: For Hyperlipidemia, unspecified hyperlipidemia type    ipratropium-albuterol 0.5 mg-2.5 mg/3 mLinhalation solution  -- 3 milliliter(s) inhaled every 6 hours, As needed, Wheezing  -- Indication: For CoPD    Lasix 20 mg oral tablet  -- 1 tab(s) by mouth once a day, As Needed water retention  -- Indication: For Hypertension, unspecified type    Zantac 150 oral tablet  -- 1 tab(s) by mouth 2 times a day  -- Indication: For GERD    omeprazole  -- 20 milligram(s) by mouth once a day   -- Indication: For GERD I will START or STAY ON the medications listed below when I get home from the hospital:    traMADol 50 mg oral tablet  -- orally 3 times a day, As Needed - for moderate pain  -- Indication: For SBO (small bowel obstruction)    acetaminophen 325 mg oral tablet  -- 2 tab(s) by mouth every 6 hours, As needed, For Temp greater than 38 C (100.4 F)  -- Indication: For SBO (small bowel obstruction)    oxyCODONE 5 mg oral tablet  -- 1 tab(s) by mouth every 4 hours, As needed, pain  -- Indication: For SBO (small bowel obstruction)    enalapril 10 mg oral tablet  -- 1 tab(s) by mouth once a day  -- Indication: For Hypertension, unspecified type    rivaroxaban 20 mg oral tablet  -- 1 tab(s) by mouth every 24 hours  -- Indication: For Chronic atrial fibrillation    Actos 15 mg oral tablet  -- 1 tab(s) by mouth once a day  -- Indication: For Type 2 diabetes mellitus without complication, unspecified long term insulin use status    glipizide-metformin 2.5 mg-500 mg oral tablet  -- orally once a day  -- Indication: For Type 2 diabetes mellitus without complication, unspecified long term insulin use status    Lantus  -- 12 unit(s) subcutaneous once a day (at bedtime)  -- Indication: For Type 2 diabetes mellitus without complication, unspecified long term insulin use status    simvastatin 10 mg oral tablet  -- orally 2 times a week  -- Indication: For Hyperlipidemia, unspecified hyperlipidemia type    ipratropium-albuterol 0.5 mg-2.5 mg/3 mLinhalation solution  -- 3 milliliter(s) inhaled every 6 hours, As needed, Wheezing  -- Indication: For CoPD    Lasix 20 mg oral tablet  -- 1 tab(s) by mouth once a day, As Needed water retention  -- Indication: For Hypertension, unspecified type    Zantac 150 oral tablet  -- 1 tab(s) by mouth 2 times a day  -- Indication: For GERD    clindamycin 300 mg oral capsule  -- 1 cap(s) by mouth every 6 hours  -- Indication: For Cellulitis    omeprazole  -- 20 milligram(s) by mouth once a day   -- Indication: For GERD

## 2018-01-03 NOTE — PROGRESS NOTE ADULT - ASSESSMENT
I/p:  89 yo female S/P ventral hernia repair, CARL, h/o afib on xarelto, rosario vasc #5    plan: 1-1-18 D/W Dr Lucas: Ok to resume Xarelto  xarelto 20 mg po daily   monitor CBC, BMP daily  BLE venodynes  INC mobility as alena   will follow  pt for transfer to [facility] today.

## 2018-01-03 NOTE — DISCHARGE NOTE ADULT - SECONDARY DIAGNOSIS.
Type 2 diabetes mellitus without complication, unspecified long term insulin use status Hypertension, unspecified type Hyperlipidemia, unspecified hyperlipidemia type

## 2018-01-03 NOTE — CONSULT NOTE ADULT - ASSESSMENT
81F five days s/p CARL and repair of incarcerated ventral hernia with biologic mesh doing well on the floor. No reason for any intervention at this time.   I will be on board for abdominal wall closure which will likely require abdominal component separation if she redevelops a surgical emergency in the future.

## 2018-01-03 NOTE — PROGRESS NOTE ADULT - SUBJECTIVE AND OBJECTIVE BOX
HPI:  this is a 87 yo female adm yesterday with cc of  worsening abd pain, nausea, vomiting since 12/24/17. Pt denied c/o fever, chills, chest pain, SOB, extremity pain or dysfunction, hemoptysis, hematemesis, hematuria, hematochexia, headache, diplopia, vertigo, dizzyness. (28 Dec 2017 03:48) found to have a incarcerated ventral hernia, now in ICU SD S/P repair and CARL, IN ER was given K centra for reversal of xarelto      Patient is a 88y old  Female who presents with a chief complaint of abd pain, nausea, vomitting since 12/24/17 (28 Dec 2017 03:48)  Consulted by Dr. Lucas  for VTE prophylaxis, risk stratification, and anticoagulation management.    PAST MEDICAL & SURGICAL HISTORY:  Hyperlipidemia, unspecified hyperlipidemia type  Chronic atrial fibrillation on Xarelto  Type 2 diabetes mellitus without complication, unspecified long term insulin use status  Hypertension, unspecified type  History of left hip replacement  History of colon resection    CrCl: 53.1    Caprini VTE Risk Score: #8    [id number] Score: #5    IMPROVE Bleeding Risk Score #3.5    Falls Risk:   High (x  )  Mod (  )  Low (  )  12-29-17 Pt seen at bedside on SD, oob in chair  NG tube noted.  Pt states she feels weak.  Discussed her xarelto on hold until she is better.  Informed her she is on heparin sq for now..   12/30/17; seen at bedside no changes   1/1/18: seen now on 3 east, [employer], no concerns  1-2-18: Pt seen at bedside with daughter in law present. discussed her anticoagulation with Xarelto No concerns.  Pt was concerned about taking Motrin with her Xarelto and she does not want it.  States Tylenol is fine.   states she will go to rehab tomorrow at [facility].  1-3-18 Pt seen at bedside daughter in law present. Concerned that her Xarelto was not on discharge form. and dosage of Lasix incorrect.   RN contacted Dr Figueredo and it was corrected. pt for transfer to rehab today at [facility].    FAMILY HISTORY:  No pertinent family history in first degree relatives    Denies any personal or familial history of clotting or bleeding disorders.    Allergies    penicillin (Rash)    Intolerances        REVIEW OF SYSTEMS    (  )Fever	     (  )Constipation	(  )SOB				(  )Headache	(  )Dysuria  (  )Chills	     (  )Melena	(  )Dyspnea present on exertion	                    (  )Dizziness                    (  )Polyuria  (  )Nausea	     (  )Hematochezia	(  )Cough			                    (  )Syncope   	(  )Hematuria  (  )Vomiting    (  )Chest Pain	(  )Wheezing			(  )Weakness  (  )Diarrhea     (  )Palpitations	(  )Anorexia			(  )Myalgia    All other review of systems negative: Yes    PHYSICAL EXAM:    Constitutional: Appears Well    Neurological: A&O x3    Skin: Warm    Respiratory and Thorax: normal effort; Breath sounds: normal; No rales/wheezing/rhonchi  	  Cardiovascular: S1, S2, irregular, NMBR	MP AFib     Gastrointestinal: BS neg, dressing intact, . + bowel sounds hypo active, + stools,  alena full liquid diet.  states she past gas today.    Genitourinary:  Bladder: non distended.    Musculoskeletal:   General Right:   no muscle/joint tenderness,   normal tone, no joint swelling,   ROM: full	    General Left:   no muscle/joint tenderness,   normal tone, no joint swelling,   ROM: full    Lower extrems:   Right: no calf tenderness              negative natty's sign               + pedal pulses    Left:   no calf tenderness              negative natty's sign               + pedal pulses          01-02    138  |  106  |  8   ----------------------------<  167<H>  4.1   |  25  |  0.49<L>    Ca    8.2<L>      02 Jan 2018 06:40  Phos  2.8     01-02  Mg     1.7     01-02                                       10.3   6.1   )-----------( 187      ( 01 Jan 2018 06:12 )             32.1       01-01    138  |  107  |  10  ----------------------------<  176<H>  4.1   |  24  |  0.52    Ca    8.3<L>      01 Jan 2018 06:12    TPro  5.6<L>  /  Alb  2.2<L>  /  TBili  0.4  /  DBili  x   /  AST  11<L>  /  ALT  13  /  AlkPhos  70  01-01                   12-30    136  |  105  |  22  ----------------------------<  239<H>  4.3   |  25  |  0.73    Ca    8.4<L>      30 Dec 2017 06:05  Phos  2.1     12-30  Mg     1.8     12-30                       	  MEDICATIONS  (STANDING):  dextrose 5%. 1000 milliLiter(s) IV Continuous <Continuous>  dextrose 50% Injectable 12.5 Gram(s) IV Push once  dextrose 50% Injectable 25 Gram(s) IV Push once  dextrose 50% Injectable 25 Gram(s) IV Push once  enalapril 10 milliGRAM(s) Oral daily  insulin lispro (HumaLOG) corrective regimen sliding scale   SubCutaneous three times a day before meals  insulin lispro (HumaLOG) corrective regimen sliding scale   SubCutaneous at bedtime  pantoprazole  Injectable 40 milliGRAM(s) IV Push daily  rivaroxaban 20 milliGRAM(s) Oral every 24 hours  simvastatin 10 milliGRAM(s) Oral <User Schedule>        DVT Prophylaxis:  LMWH                   (  )  Heparin SQ           ()  Coumadin             (  )  Xarelto                  ( x )  Eliquis                   (  )  Venodynes           ( x )  Ambulation          (x  )  UFH                       (  )  Contraindicated  (  )

## 2018-01-03 NOTE — PROGRESS NOTE ADULT - SUBJECTIVE AND OBJECTIVE BOX
Consult Note Adult-Hospitalist Attending    Consult Note:   · Provider Specialty	Hospitalist	    Referral/Consultation:    Initial Consult:  · Requested by Name:	Dr. Lucas	  · Date/Time:	28-Dec-2017 13:02	  · Reason for Referral/Consultation:	medical management	      · Subjective and Objective: 	  PMD: Dr. Brittany Montelongo [phone]    CC: abd pain, N/V    HPI: 87 yo female with PMH of Paroxysmal A. fib, HTN, HLD, DM2, arthritis, GERD, colon CA presents to ED with complaint of abd pain, N/V since . Pt is visiting family here for the holidays. SHe has a history of colon cancer s/p resection. In ED CT scan of abdomen showed hig grade SBO within umbilical hernia. She underwent urgent surgery this morning for lysis of adhesion and repair of hernia with mesh with Dr. Lucas.   Currently pt resting comfortably. Feels soar around incision site. Has pope in place but decreased urine output (has only put out 25cc of dark urine for the past 1.5 hrs). Hypotensive. Feels tired. No chest pain or SOB.  Home medications: actos 15mg daily, glipizide-metofromin 2.5-500mg daily, lantus 12 units qhs, enalapril 10mg daily, tramadol 50mg TID PRN, simvastatin 10mg 2x/week, lasix 40mg PRN, xarelto 20mg daily, prilosec and zantac PRN  PSH: colon resection, left hip replacement    18: +flatus and Bowel movements; Tolerating diet; Occasional abd pain  18: yesterday patient tolerated diet and had low phosphorus levels; No other complaints.   1/3/18:  Patient has no complaints; bradycardia overnight while sleepnig. Asymptomatic    ROS: negative unless stated above in HPI  Vital Signs Last 24 Hrs  T(C): 37.1 (2018 10:10), Max: 37.5 (2018 17:30)  T(F): 98.8 (2018 10:10), Max: 99.5 (2018 17:30)  HR: 71 (2018 10:10) (52 - 71)  BP: 150/50 (2018 10:10) (150/50 - 184/65)  BP(mean): 95 (2018 05:30) (95 - 95)  RR: 16 (2018 10:10) (16 - 16)  SpO2: 99% (2018 10:10) (97% - 99%)    PHYSICAL EXAM:    Constitutional: NAD, well-groomed, well-developed  HEENT: PERRLA, EOMI, Normal Hearing  Neck: No LAD, No JVD  Back: Normal spine flexure, No CVA tenderness  Cardiovascular: S1 and S2, RRR  Respiratory: CTAB  Gastrointestinal: soft, tenderness around incision site, dressing C/D/I  Extremities: No peripheral edema  Vascular: 2+ peripheral pulses  Neurological: A/O x 3, no focal deficits  Psychiatric: Normal mood, normal affect  Skin: No rashes      MEDICATIONS  (STANDING):  ciprofloxacin   IVPB      ciprofloxacin   IVPB 400 milliGRAM(s) IV Intermittent every 12 hours  dextrose 5%. 1000 milliLiter(s) (50 mL/Hr) IV Continuous <Continuous>  dextrose 50% Injectable 12.5 Gram(s) IV Push once  dextrose 50% Injectable 25 Gram(s) IV Push once  dextrose 50% Injectable 25 Gram(s) IV Push once  heparin  Injectable 5000 Unit(s) SubCutaneous every 12 hours  HYDROmorphone PCA (1 mG/mL) 30 milliLiter(s) PCA Continuous PCA Continuous  insulin lispro (HumaLOG) corrective regimen sliding scale   SubCutaneous three times a day before meals  metoprolol    tartrate Injectable 5 milliGRAM(s) IV Push every 6 hours  metroNIDAZOLE  IVPB      metroNIDAZOLE  IVPB 500 milliGRAM(s) IV Intermittent every 8 hours  pantoprazole  Injectable 40 milliGRAM(s) IV Push daily  sodium chloride 0.9% Bolus 1000 milliLiter(s) IV Bolus once  sodium chloride 0.9%. 1000 milliLiter(s) (75 mL/Hr) IV Continuous <Continuous>    MEDICATIONS  (PRN):  acetaminophen   Tablet 650 milliGRAM(s) Oral every 6 hours PRN For Temp greater than 38 C (100.4 F)  dextrose Gel 1 Dose(s) Oral once PRN Blood Glucose LESS THAN 70 milliGRAM(s)/deciliter  glucagon  Injectable 1 milliGRAM(s) IntraMuscular once PRN Glucose LESS THAN 70 milligrams/deciliter  HYDROmorphone PCA (1 mG/mL) Rescue Clinician Bolus 0.5 milliGRAM(s) IV Push every 15 minutes PRN for Pain Scale GREATER THAN 6  naloxone Injectable 0.1 milliGRAM(s) IV Push every 3 minutes PRN For ANY of the following changes in patient status:  A. RR LESS THAN 10 breaths per minute, B. Oxygen saturation LESS THAN 90%, C. Sedation score of 6  ondansetron Injectable 4 milliGRAM(s) IV Push every 6 hours PRN Nausea  ondansetron Injectable 4 milliGRAM(s) IV Push every 6 hours PRN Nausea    Lab Results:  CBC    .		Differential:	[] Automated		[] Manual  Chemistry        138  |  106  |  8   ----------------------------<  167<H>  4.1   |  25  |  0.49<L>    Ca    8.2<L>      2018 06:40  Phos  2.8       Mg     1.7           MICROBIOLOGY/CULTURES:  Culture Results:   10,000 - 49,000 CFU/mL Coag Negative Staphylococcus ( @ 22:19)  Culture Results:   No growth at 5 days. ( @ 21:06)  Culture Results:   No growth at 5 days. ( @ 21:06)    Urinalysis Basic - ( 27 Dec 2017 22:19 )    Color: Yellow / Appearance: Clear / S.025 / pH: x  Gluc: x / Ketone: Trace  / Bili: Negative / Urobili: 1 mg/dL   Blood: x / Protein: 100 mg/dL / Nitrite: Negative   Leuk Esterase: Trace / RBC: 6-10 /HPF / WBC 26-50   Sq Epi: x / Non Sq Epi: Occasional / Bacteria: Moderate      Hemoglobin A1C, Whole Blood: 6.0 % ( @ 07:25)    < from: CT Abdomen and Pelvis w/ Oral Cont and w/ IV Cont (.17 @ 00:35) >  EXAM:  CT ABDOMEN AND PELVIS OC IC                            PROCEDURE DATE:  2017          INTERPRETATION:  EXAM: CT ABDOMEN AND PELVIS WITH CONTRAST    CLINICAL INFORMATION:  Nodular and vomiting.  No oral intake for several   days.  Rule out bowel obstruction.    TECHNIQUE:   Axial CT images were acquired through the abdomen and pelvis.  Intravenous contrast:  95 cc of Omnipaque-350 mg/ml were administered,   and 5 cc were discarded.  Oral contrast:  Positive oral contrast was administered.  Coronal and sagittal reformats were generated.     COMPARISON STUDY:  None.    FINDINGS:  Visualized lower chest: Heart appears mildly enlarged.  Atherosclerotic   calcification of the visualized coronary arteries.    Liver: Unremarkable.  Bile ducts: Normal caliber.  Gallbladder: No evidence of gallstones or acute cholecystitis.  Spleen: Multiple punctate calcifications in the spleen compatible with  Pancreas: Markedly atrophic.  Adrenal glands: Unremarkable.   Kidneys/ureters: Kidneys enhance symmetrically without hydronephrosis or   renal stones.  Subcentimeter hypoattenuating left renal lesions are too   small to characterize by CT scan.      Pelvic organs are partially treated by streak artifact from left hip   prosthesis.  Bladder: Unremarkable.  Reproductive organs: No evidence of uterine or adnexal mass.    Bowel: There is a 1.5 cm linear calcific versus metallic foreign body   abutting the anterior (anti-dependent) wall of the stomach (2:28-30).    There is no associated wall gastric wall thickening with surrounding   inflammatory changes.  There is high-grade small bowel obstruction with   single transition point at the level of an umbilical hernia.  No   associated bowel wall thickening or pneumatosis.  Peritoneum: Mild abdominal and pelvic ascites.  No free air or abscess.    Retroperitoneum: No lymphadenopathy.  Vasculature: Atherosclerotic calcifications of the aortoiliac tree.    Abdominal wall/soft tissues: Approximately 9 x 7 sending umbilical hernia   witha 2.5 cm wide neck contains obstructed small bowel as well as   collapsed distal small bowel loops.  Bones: Marked thoracolumbar scoliosis and multilevel degenerative changes   in the spine.  Moderate to severe spinal canal stenosis at L2-L3, L3-L4   and L4-L5.  A left hip prosthesis is partially visualized.      IMPRESSION:  High-grade small bowel obstruction with single transition point at the   level of an umbilical hernia.  Surgical consultation is recommended.    Underlying incarceration and/or bowel ischemia should be excluded   clinically.  There is no bowel wall thickening, pneumatosis or   mesenteric/portal venous gas.  No evidence of gastrointestinal   perforation or abscess/drainable fluid collection.    Additionally there is a 1.5cm linear calcific versus metallic foreign   body abutting the anterior (anti-dependent ) wall the stomach without   evidence of gastric wall thickening or surrounding inflammatory changes.    The umbilical hernia measures approximately approximate 9x 7 cm with a   2.5 cm wide neck and contains obstructed small bowel as well as collapsed   distal small bowel loops.    < end of copied text >    < from: Xray Chest 1 View AP/PA. (17 @ 21:58) >    IMPRESSION:    Clear lungs    < end of copied text >

## 2018-01-03 NOTE — PROGRESS NOTE ADULT - PROBLEM SELECTOR PLAN 2
Normotensive
Normotensive - previously seen hypotension responded to IVF
Normotensive on enalpril.
await bowel function
replace  monitor
Normotensive on enalapril.
Normotensive on enalapril.

## 2018-01-03 NOTE — CONSULT NOTE ADULT - SUBJECTIVE AND OBJECTIVE BOX
87 y/o female now 5 days s/p CARL and incarcerated ventral hernia repair with biologic mesh for high grade SBO doing well on the floor, tolerating regular diet. + flatus, no nausea, no BM as of yet.   Plan for discharge to a rehab. facility later today. Pain well controlled.   The trauma surgery team asked me to evaluate the patient for possible need for abdominal wall component separation in the case that she needs another abdominal surgery in the future.                Past Medical History:  Chronic atrial fibrillation    Colon cancer    Hyperlipidemia, unspecified hyperlipidemia type    Hypertension, unspecified type    Type 2 diabetes mellitus without complication, unspecified long term insulin use status.     Past Surgical History:  History of colon resection    History of left hip replacement.      Medications:    Actos 15 mg oral tablet: 1 tab(s) orally once a day  · 	glipizide-metformin 2.5 mg-500 mg oral tablet: orally once a day  · 	Lantus: 12 unit(s) subcutaneous once a day (at bedtime)  · 	enalapril 10 mg oral tablet: 1 tab(s) orally once a day  · 	traMADol 50 mg oral tablet: orally 3 times a day, As Needed - for moderate pain  · 	simvastatin 10 mg oral tablet: orally 2 times a week  · 	furosemide 20 mg oral tablet: 1 tab(s) orally once a day  · 	Xarelto 20 mg oral tablet: 1 tab(s) orally once a day (in the evening)  · 	PriLOSEC:   	Align: orally once a day      PE:  AVSS  comfortable, NAD  abdomen: soft/ND/NT  periumbilical midline incision intact with staples;   edge of skin along closure site ecchymotic ~1-2mm.   No fluid collection along abdominal wall cavity.         CT scan 12/28 abd/pelvis with cont:  high grade SBO with transition point at level of umbilical hernia which is 9x7cm with a 2.5cm wide neck.   (prior to surgery)

## 2018-01-03 NOTE — PROGRESS NOTE ADULT - PROBLEM SELECTOR PROBLEM 1
Chronic atrial fibrillation
SBO (small bowel obstruction)
SBO (small bowel obstruction)
Chronic atrial fibrillation
Chronic atrial fibrillation
SBO (small bowel obstruction)
SBO (small bowel obstruction)

## 2018-01-03 NOTE — DISCHARGE NOTE ADULT - PATIENT PORTAL LINK FT
“You can access the Follow[employer] Patient Portal, offered by [facility], by registering with the following website: [email or web address]”

## 2018-01-03 NOTE — PROGRESS NOTE ADULT - ASSESSMENT
87 yo female with PMH of Paroxysmal A. fib, HTN, HLD, DM2, arthritis, GERD, colon CA presents to ED with complaint of abd pain, N/V since 12/24. Pt is visiting family here for the holidays. SHe has a history of colon cancer s/p resection. In ED CT scan of abdomen showed hig grade SBO within umbilical hernia. She underwent urgent surgery this morning for lysis of adhesion and repair of hernia with mesh with Dr. Lucas.     #SBO s/p lysis of adhesions and repair of umbilical hernia   - management as per primary surgery team  - pain control   - advance diet as per surgery  - incentive spirometry    #Post op hypotension: RESOLVED   - pope in place for strict I/Os  - s/p 4L IVFs.  output improved.  - continue maintenance fluids    #Leukocytosis - likely related to incarcerated hernia - Resolved  - leukocytosis resolved.  - CXR negative    #Paroxysmal A. fib   - not on rate control at home  - continue Xarelto  - Anticoag services following.   - cardio consult appreciated     #Nocturnal bradycardia   -Asymptomatic - Can D/C Tele     #DM2  - HbA1c 6.0  - hold actos and glipizide/metformin  - ISS  - hold lantus 12Units QHS until pt tolerating diet well - can resume at home    #HTN  - resume enalapril with parameters    #HLD  - restart statin     #GERD  - on protonix    #DVT prophylaxis  - Xarelto    Thank you for this courtesy consult

## 2018-01-03 NOTE — DISCHARGE NOTE ADULT - CARE PLAN
Principal Discharge DX:	SBO (small bowel obstruction)  Goal:	healing  Instructions for follow-up, activity and diet:	post op doing well  Secondary Diagnosis:	Type 2 diabetes mellitus without complication, unspecified long term insulin use status  Instructions for follow-up, activity and diet:	monitor  Secondary Diagnosis:	Hypertension, unspecified type  Instructions for follow-up, activity and diet:	monitor  Secondary Diagnosis:	Hyperlipidemia, unspecified hyperlipidemia type  Instructions for follow-up, activity and diet:	monitor

## 2018-01-03 NOTE — PROGRESS NOTE ADULT - PROBLEM SELECTOR PLAN 3
Management as per surgery.
POD # 1; management as per surgery.
POD # 2; management as per surgery.
Management as per surgery.
Management as per surgery.

## 2018-01-03 NOTE — PROGRESS NOTE ADULT - PROBLEM SELECTOR PROBLEM 2
Hypertension, unspecified type
Hypophosphatemia
Ileus, postoperative
Hypertension, unspecified type
Hypertension, unspecified type
Chronic atrial fibrillation

## 2018-01-03 NOTE — PROGRESS NOTE ADULT - PROBLEM SELECTOR PROBLEM 3
SBO (small bowel obstruction)
Ileus, postoperative

## 2018-01-03 NOTE — DISCHARGE NOTE ADULT - MEDICATION SUMMARY - MEDICATIONS TO STOP TAKING
I will STOP taking the medications listed below when I get home from the hospital:    Xarelto 20 mg oral tablet  -- 1 tab(s) by mouth once a day (in the evening) I will STOP taking the medications listed below when I get home from the hospital:  None

## 2018-01-03 NOTE — PROGRESS NOTE ADULT - PROVIDER SPECIALTY LIST ADULT
Anticoag Management
Cardiology
Hospitalist
Surgery
Anticoag Management
Surgery
Surgery

## 2018-01-09 DIAGNOSIS — K56.7 ILEUS, UNSPECIFIED: ICD-10-CM

## 2018-01-09 DIAGNOSIS — Z85.038 PERSONAL HISTORY OF OTHER MALIGNANT NEOPLASM OF LARGE INTESTINE: ICD-10-CM

## 2018-01-09 DIAGNOSIS — E78.5 HYPERLIPIDEMIA, UNSPECIFIED: ICD-10-CM

## 2018-01-09 DIAGNOSIS — K21.9 GASTRO-ESOPHAGEAL REFLUX DISEASE WITHOUT ESOPHAGITIS: ICD-10-CM

## 2018-01-09 DIAGNOSIS — I48.2 CHRONIC ATRIAL FIBRILLATION: ICD-10-CM

## 2018-01-09 DIAGNOSIS — M19.90 UNSPECIFIED OSTEOARTHRITIS, UNSPECIFIED SITE: ICD-10-CM

## 2018-01-09 DIAGNOSIS — Z79.4 LONG TERM (CURRENT) USE OF INSULIN: ICD-10-CM

## 2018-01-09 DIAGNOSIS — E83.39 OTHER DISORDERS OF PHOSPHORUS METABOLISM: ICD-10-CM

## 2018-01-09 DIAGNOSIS — K43.0 INCISIONAL HERNIA WITH OBSTRUCTION, WITHOUT GANGRENE: ICD-10-CM

## 2018-01-09 DIAGNOSIS — Z90.49 ACQUIRED ABSENCE OF OTHER SPECIFIED PARTS OF DIGESTIVE TRACT: ICD-10-CM

## 2018-01-09 DIAGNOSIS — I10 ESSENTIAL (PRIMARY) HYPERTENSION: ICD-10-CM

## 2018-01-09 DIAGNOSIS — Z79.01 LONG TERM (CURRENT) USE OF ANTICOAGULANTS: ICD-10-CM

## 2018-01-09 DIAGNOSIS — R00.1 BRADYCARDIA, UNSPECIFIED: ICD-10-CM

## 2018-01-09 DIAGNOSIS — D72.829 ELEVATED WHITE BLOOD CELL COUNT, UNSPECIFIED: ICD-10-CM

## 2018-01-09 DIAGNOSIS — I48.0 PAROXYSMAL ATRIAL FIBRILLATION: ICD-10-CM

## 2018-01-09 DIAGNOSIS — Z96.642 PRESENCE OF LEFT ARTIFICIAL HIP JOINT: ICD-10-CM

## 2018-01-09 DIAGNOSIS — I95.81 POSTPROCEDURAL HYPOTENSION: ICD-10-CM

## 2018-01-09 DIAGNOSIS — R10.9 UNSPECIFIED ABDOMINAL PAIN: ICD-10-CM

## 2018-01-09 DIAGNOSIS — K66.0 PERITONEAL ADHESIONS (POSTPROCEDURAL) (POSTINFECTION): ICD-10-CM

## 2018-01-09 DIAGNOSIS — E11.9 TYPE 2 DIABETES MELLITUS WITHOUT COMPLICATIONS: ICD-10-CM

## 2018-03-12 NOTE — DISCHARGE NOTE ADULT - NS AS DC FU INST LIST INST
Abdomen: Supine and upright views of the abdomen were obtained.

 

Comparison: Prior abdominal x-ray of 01/06/13.

 

Calcifications are seen within the left side of the pelvis which are 

felt compatible with phleboliths.  Mild degenerative change is 

scattered within the spine.  Bowel gas pattern is unremarkable.  No 

soft tissue abnormality is appreciated.  No free air is seen.

 

Impression:

1.  Incidental findings.

 

Diagnostic code #2 no

## 2024-10-20 NOTE — PATIENT PROFILE ADULT. - ALCOHOL USE HISTORY SINGLE SELECT
You can access the FollowMyHealth Patient Portal offered by [facility] by registering at the following website: [email or web address]. By joining [facility]’s FollowMyHealth portal, you will also be able to view your health information using other applications (apps) compatible with our system.
never